# Patient Record
Sex: FEMALE | Race: WHITE | NOT HISPANIC OR LATINO | Employment: FULL TIME | URBAN - METROPOLITAN AREA
[De-identification: names, ages, dates, MRNs, and addresses within clinical notes are randomized per-mention and may not be internally consistent; named-entity substitution may affect disease eponyms.]

---

## 2018-04-12 ENCOUNTER — OFFICE VISIT CONVERTED (OUTPATIENT)
Dept: FAMILY MEDICINE CLINIC | Facility: CLINIC | Age: 33
End: 2018-04-12
Attending: NURSE PRACTITIONER

## 2018-08-30 ENCOUNTER — OFFICE VISIT CONVERTED (OUTPATIENT)
Dept: FAMILY MEDICINE CLINIC | Facility: CLINIC | Age: 33
End: 2018-08-30
Attending: NURSE PRACTITIONER

## 2018-09-12 ENCOUNTER — OFFICE VISIT CONVERTED (OUTPATIENT)
Dept: FAMILY MEDICINE CLINIC | Facility: CLINIC | Age: 33
End: 2018-09-12
Attending: NURSE PRACTITIONER

## 2018-09-12 ENCOUNTER — CONVERSION ENCOUNTER (OUTPATIENT)
Dept: FAMILY MEDICINE CLINIC | Facility: CLINIC | Age: 33
End: 2018-09-12

## 2021-04-28 ENCOUNTER — OFFICE VISIT CONVERTED (OUTPATIENT)
Dept: FAMILY MEDICINE CLINIC | Facility: CLINIC | Age: 36
End: 2021-04-28
Attending: NURSE PRACTITIONER

## 2021-04-28 ENCOUNTER — HOSPITAL ENCOUNTER (OUTPATIENT)
Dept: FAMILY MEDICINE CLINIC | Facility: CLINIC | Age: 36
Discharge: HOME | End: 2021-04-28
Attending: NURSE PRACTITIONER

## 2021-04-28 LAB
ALBUMIN SERPL-MCNC: 4.2 G/DL (ref 3.5–5)
ALBUMIN/GLOB SERPL: 1.3 {RATIO} (ref 1.4–2.6)
ALP SERPL-CCNC: 60 U/L (ref 42–98)
ALT SERPL-CCNC: 8 U/L (ref 10–40)
ANION GAP SERPL CALC-SCNC: 19 MMOL/L (ref 8–19)
AST SERPL-CCNC: 14 U/L (ref 15–50)
BASOPHILS # BLD AUTO: 0.05 10*3/UL (ref 0–0.2)
BASOPHILS NFR BLD AUTO: 0.9 % (ref 0–3)
BILIRUB SERPL-MCNC: <0.15 MG/DL (ref 0.2–1.3)
BILIRUB UR QL STRIP: NEGATIVE
BUN SERPL-MCNC: 5 MG/DL (ref 5–25)
BUN/CREAT SERPL: 8 {RATIO} (ref 6–20)
CALCIUM SERPL-MCNC: 9.6 MG/DL (ref 8.7–10.4)
CHLORIDE SERPL-SCNC: 107 MMOL/L (ref 99–111)
CHOLEST SERPL-MCNC: 196 MG/DL (ref 107–200)
CHOLEST/HDLC SERPL: 3.4 {RATIO} (ref 3–6)
COLOR UR: YELLOW
CONV ABS IMM GRAN: 0.01 10*3/UL (ref 0–0.2)
CONV CLARITY OF URINE: CLEAR
CONV CO2: 20 MMOL/L (ref 22–32)
CONV IMMATURE GRAN: 0.2 % (ref 0–1.8)
CONV PROTEIN IN URINE BY AUTOMATED TEST STRIP: NEGATIVE
CONV TOTAL PROTEIN: 7.4 G/DL (ref 6.3–8.2)
CONV UROBILINOGEN IN URINE BY AUTOMATED TEST STRIP: NORMAL
CREAT UR-MCNC: 0.66 MG/DL (ref 0.5–0.9)
DEPRECATED RDW RBC AUTO: 42.5 FL (ref 36.4–46.3)
EOSINOPHIL # BLD AUTO: 0.05 10*3/UL (ref 0–0.7)
EOSINOPHIL # BLD AUTO: 0.9 % (ref 0–7)
ERYTHROCYTE [DISTWIDTH] IN BLOOD BY AUTOMATED COUNT: 12.3 % (ref 11.7–14.4)
GFR SERPLBLD BASED ON 1.73 SQ M-ARVRAT: >60 ML/MIN/{1.73_M2}
GLOBULIN UR ELPH-MCNC: 3.2 G/DL (ref 2–3.5)
GLUCOSE SERPL-MCNC: 89 MG/DL (ref 65–99)
GLUCOSE UR QL: NEGATIVE
HCT VFR BLD AUTO: 34.2 % (ref 37–47)
HDLC SERPL-MCNC: 58 MG/DL (ref 40–60)
HGB BLD-MCNC: 10.7 G/DL (ref 12–16)
HGB UR QL STRIP: NEGATIVE
KETONES UR QL STRIP: NEGATIVE
LDLC SERPL CALC-MCNC: 107 MG/DL (ref 70–100)
LEUKOCYTE ESTERASE UR QL STRIP: NEGATIVE
LYMPHOCYTES # BLD AUTO: 1.91 10*3/UL (ref 1–5)
LYMPHOCYTES NFR BLD AUTO: 34.9 % (ref 20–45)
MCH RBC QN AUTO: 29.1 PG (ref 27–31)
MCHC RBC AUTO-ENTMCNC: 31.3 G/DL (ref 33–37)
MCV RBC AUTO: 92.9 FL (ref 81–99)
MONOCYTES # BLD AUTO: 0.38 10*3/UL (ref 0.2–1.2)
MONOCYTES NFR BLD AUTO: 6.9 % (ref 3–10)
NEUTROPHILS # BLD AUTO: 3.07 10*3/UL (ref 2–8)
NEUTROPHILS NFR BLD AUTO: 56.2 % (ref 30–85)
NITRITE UR QL STRIP: NEGATIVE
NRBC CBCN: 0 % (ref 0–0.7)
OSMOLALITY SERPL CALC.SUM OF ELEC: 291 MOSM/KG (ref 273–304)
PH UR STRIP.AUTO: 6 [PH]
PLATELET # BLD AUTO: 392 10*3/UL (ref 130–400)
PMV BLD AUTO: 10.3 FL (ref 9.4–12.3)
POTASSIUM SERPL-SCNC: 4.2 MMOL/L (ref 3.5–5.3)
RBC # BLD AUTO: 3.68 10*6/UL (ref 4.2–5.4)
SODIUM SERPL-SCNC: 142 MMOL/L (ref 135–147)
SP GR UR: 1.02
TRIGL SERPL-MCNC: 156 MG/DL (ref 40–150)
TSH SERPL-ACNC: 2.92 M[IU]/L (ref 0.27–4.2)
VLDLC SERPL-MCNC: 31 MG/DL (ref 5–37)
WBC # BLD AUTO: 5.47 10*3/UL (ref 4.8–10.8)

## 2021-04-29 ENCOUNTER — HOSPITAL ENCOUNTER (OUTPATIENT)
Dept: FAMILY MEDICINE CLINIC | Facility: CLINIC | Age: 36
Discharge: HOME | End: 2021-04-29
Attending: NURSE PRACTITIONER

## 2021-04-29 LAB
C TRACH RRNA CVX QL NAA+PROBE: NEGATIVE
FERRITIN SERPL-MCNC: 16 NG/ML (ref 10–200)
FOLATE SERPL-MCNC: >20 NG/ML (ref 4.8–20)
IRON SATN MFR SERPL: 21 % (ref 20–55)
IRON SERPL-MCNC: 123 UG/DL (ref 60–170)
N GONORRHOEA DNA SPEC QL NAA+PROBE: NEGATIVE
RBC # BLD AUTO: 3.81 10*6/UL (ref 4.2–5.4)
RETICS # AUTO: 0.04 10*6/UL (ref 0.02–0.08)
RETICS/RBC NFR AUTO: 1.16 % (ref 0.5–1.7)
TIBC SERPL-MCNC: 586 UG/DL (ref 245–450)
TRANSFERRIN SERPL-MCNC: 410 MG/DL (ref 250–380)
VIT B12 SERPL-MCNC: 381 PG/ML (ref 211–911)
WBC # BLD AUTO: 5.27 10*3/UL (ref 4.8–10.8)

## 2021-04-30 LAB — BACTERIA SPEC AEROBE CULT: NORMAL

## 2021-05-14 VITALS
OXYGEN SATURATION: 99 % | DIASTOLIC BLOOD PRESSURE: 70 MMHG | WEIGHT: 179 LBS | SYSTOLIC BLOOD PRESSURE: 115 MMHG | RESPIRATION RATE: 18 BRPM | HEART RATE: 68 BPM | TEMPERATURE: 97.8 F | HEIGHT: 65 IN | BODY MASS INDEX: 29.82 KG/M2

## 2021-05-14 NOTE — PROGRESS NOTES
Progress Note      Patient Name: Francine Calderon   Patient ID: [id number]   Sex: Female   YOB: 1985    Primary Care Provider: Bre MORGAN    Visit Date: April 28, 2021    Provider: CATHY Enriquez   Location: [facility]   Location Address: [address] [place] [address]  [id number]   Location Phone: [phone]          Chief Complaint  · Annual physical exam      History Of Present Illness  Francine Calderon is a 35 year old /White female who presents for evaluation and treatment of:      Annual physical exam      pt c/o burning with urination, urinary urgency and frequency    c/o- states that she has had urgency and frequency as well as burning with urination for 2 weeks.  Has had cloudy urine  also vaginal discharge. Has been yellowish-brown in color.     pap- 12/2020 EPW  miscarriage in 2.2021 at almost 9 weeks   LMP 4.14.2021  nonsmoker       Past Medical History  Disease Name Date Onset Notes   Scoliosis --  --          Past Surgical History  Procedure Name Date Notes   Fusion of lumbar or thoracic spine by posterior approach for treatment of scoliosis --  --    heart surgery --  --          Medication List  Name Date Started Instructions   Centrum MultiGummies 80 mcg oral tablet,chewable  chew 1 tablet by oral route daily   Nexium 40 mg oral capsule,delayed release(DR/EC)  take 1 capsule (40 mg) by oral route once QOD   Tri-Sprintec (28) 0.18/0.215/0.25 mg-35 mcg (28) oral tablet  take 1 tablet by oral route once daily   Zyrtec 10 mg oral tablet  take 1 tablet (10 mg) by oral route once daily         Allergy List  Allergen Name Date Reaction Notes   NO KNOWN DRUG ALLERGIES --  --  --          Family Medical History  Disease Name Relative/Age Notes   Cancer, Unspecified Aunt/  Grandmother (maternal)/   --    Melanoma Father/   --    Gallbladder Disorder Father/   --          Social History  Finding Status Start/Stop  "Quantity Notes   Tobacco Never --/-- --  --          Review of Systems  · Constitutional  o Denies  o : fever, chills  · Cardiovascular  o Denies  o : chest Pain  · Respiratory  o Denies  o : frequent cough, shortness of breath  · Gastrointestinal  o Denies  o : abdominal pain, nausea, vomiting, diarrhea  · Genitourinary  o Admits  o : urinary frequency, urinary urgency, vaginal discharge  o Denies  o : polyuria  · Integument  o Denies  o : rash  · Allergic-Immunologic  o Admits  o : seasonal allergies      Vitals  Date Time BP Position Site L\R Cuff Size HR RR TEMP (F) WT  HT  BMI kg/m2 BSA m2 O2 Sat FR L/min FiO2 HC       04/12/2018 08:45 /84 Sitting    72 - R 16  194lbs 16oz 5'  5\" 32.45 2.01 100 %      08/30/2018 08:47 /74 Sitting    61 - R 18  193lbs 16oz 5'  5\" 32.28 2.01 100 %      09/12/2018 07:07 /78 Sitting    79 - R   193lbs 16oz 5'  5\" 32.28 2.01 96 %      04/28/2021 07:39 /70 Sitting    68 - R 18 97.8 179lbs 0oz 5'  5\" 29.79 1.93 99 %            Physical Examination  · Constitutional  o Appearance  o : well-nourished, in no acute distress  · Eyes  o Conjunctivae  o : conjunctivae normal  o Sclerae  o : sclerae white  o Pupils and Irises  o : pupils equal and round  o Eyelids/Ocular Adnexae  o : eyelid appearance normal  · Ears, Nose, Mouth and Throat  o Ears  o :   § External Ears  § : external auditory canal appearance within normal limits  § Otoscopic Examination  § : tympanic membrane appearance within normal limits bilaterally  o Nose  o :   § External Nose  § : appearance normal  § Intranasal Exam  § : mucosa within normal limits  § Nasopharynx  § : no discharge present  o Throat  o :   § Oropharynx  § : no inflammation or lesions present, tonsils within normal limits  · Neck  o Inspection/Palpation  o : normal appearance, trachea midline  o Thyroid  o : gland size normal, nontender  · Respiratory  o Respiratory Effort  o : breathing unlabored  o Auscultation of Lungs  o : " normal breath sounds throughout inspiration and expiration  · Cardiovascular  o Heart  o :   § Auscultation of Heart  § : regular rate and rhythm, no murmurs  o Peripheral Vascular System  o :   § Carotid Arteries  § : no bruits present  § Extremities  § : no clubbing or edema  · Gastrointestinal  o Abdominal Examination  o : abdomen nontender to palpation, bowel sounds present   · Genitourinary  o External Genitalia  o : no inflammation, no lesions present  o Vagina  o : normal vaginal vault, white discharge present, no inflammatory lesions present, no masses present  o Urethra  o :   § Urethral Meatus  § : no inflammation present  § Urethral Body  § : urethra palpation normal  o Bladder  o : nontender to palpation  o Cervix  o : appearance healthy, no lesions present, nontender to palpation, no discharges, no bleeding present, normal midline position  o Uterus  o : nontender to palpation, no masses present, position midline/midplane  o Adnexa  o : no tenderness or masses present on bimanual examination  o Anus  o : no inflammation or lesions present  o Perineum  o : perineum within normal limits  · Lymphatic  o Neck  o : no lymphadenopathy present  · Skin and Subcutaneous Tissue  o General Inspection  o : no rashes or lesions present, no areas of discoloration  · Neurologic  o Mental Status Examination  o :   § Orientation  § : grossly oriented   o Gait and Station  o : normal gait, able to stand without difficulty  · Psychiatric  o Judgement and Insight  o : judgment and insight intact  o Mood and Affect  o : mood normal, affect appropriate          Results  · In-Office Procedures  o Lab procedure  § IOP - Urinalysis without Microscopy (Clinitek) [facility] ([id number])   § Color Ur: Yellow   § Clarity Ur: Clear   § Glucose Ur Ql Strip: Negative   § Bilirub Ur Ql Strip: Negative   § Ketones Ur Ql Strip: Negative   § Sp Gr Ur Qn: 1.020   § Hgb Ur Ql Strip: Negative   § pH Ur-LsCnc: 6.0   § Prot Ur Ql Strip: Negative    § Urobilinogen Ur Strip-mCnc: 0.2 E.U./dL   § Nitrite Ur Ql Strip: Negative   § WBC Est Ur Ql Strip: Negative       Assessment  · Annual physical exam     V70.0/Z00.00  · Allergic rhinitis due to allergen     477.9/J30.9  taking otc Zyrtec   · Urinary urgency     788.63/R39.15  · Urinary frequency     788.41/R35.0  · Vaginal discharge     623.5/N89.8  will obtain swabs, treat for vaginitis   · Burning with urination     788.1/R30.0      Plan  · Orders  o Physical, Primary Care Panel (CBC, CMP, Lipid, TSH) [facility] (77613, 11034, 02517, 21514) - 623.5/N89.8, 788.1/R30.0, V70.0/Z00.00 - 04/28/2021  o ACO-39: Current medications updated and reviewed (, 1159F) - - 04/28/2021  o Vaginal Screen (Candida, Gardnerella & Trichomonas) (47400) - 788.41/R35.0, 623.5/N89.8, 788.1/R30.0 - 04/28/2021  o GC/Chlamydia by PCR (Urine or Vaginal Swab) [facility] (CTNGX) - 788.41/R35.0, 623.5/N89.8, 788.1/R30.0 - 04/28/2021  o Vaginal culture (57379) - 788.41/R35.0, 623.5/N89.8, 788.1/R30.0 - 04/28/2021  · Medications  o Flagyl 500 mg oral tablet   SIG: take 1 tablet (500 mg) by oral route every 12 hours for 7 days   DISP: (14) Tablet with 0 refills  Prescribed on 04/28/2021     o Medications have been Reconciled  o Transition of Care or Provider Policy  · Instructions  o Patient was educated/instructed on their diagnosis, treatment and medications prior to discharge from the clinic today.  · Disposition  o will contact with diagnostics results  o FOLLOW UP PENDING RESULTS            Electronically Signed by: CATHY Enriquez -Author on April 28, 2021 08:19:29 AM

## 2021-05-16 VITALS
DIASTOLIC BLOOD PRESSURE: 78 MMHG | OXYGEN SATURATION: 96 % | HEIGHT: 65 IN | BODY MASS INDEX: 32.32 KG/M2 | HEART RATE: 79 BPM | WEIGHT: 194 LBS | SYSTOLIC BLOOD PRESSURE: 145 MMHG

## 2021-05-16 VITALS
OXYGEN SATURATION: 100 % | SYSTOLIC BLOOD PRESSURE: 127 MMHG | HEART RATE: 61 BPM | HEIGHT: 65 IN | RESPIRATION RATE: 18 BRPM | WEIGHT: 194 LBS | BODY MASS INDEX: 32.32 KG/M2 | DIASTOLIC BLOOD PRESSURE: 74 MMHG

## 2021-05-16 VITALS
HEIGHT: 65 IN | RESPIRATION RATE: 16 BRPM | SYSTOLIC BLOOD PRESSURE: 129 MMHG | HEART RATE: 72 BPM | BODY MASS INDEX: 32.49 KG/M2 | WEIGHT: 195 LBS | OXYGEN SATURATION: 100 % | DIASTOLIC BLOOD PRESSURE: 84 MMHG

## 2021-09-20 ENCOUNTER — E-VISIT (OUTPATIENT)
Dept: FAMILY MEDICINE CLINIC | Facility: TELEHEALTH | Age: 36
End: 2021-09-20

## 2021-09-21 ENCOUNTER — TELEPHONE (OUTPATIENT)
Dept: FAMILY MEDICINE CLINIC | Facility: CLINIC | Age: 36
End: 2021-09-21

## 2021-09-21 ENCOUNTER — OFFICE VISIT (OUTPATIENT)
Dept: FAMILY MEDICINE CLINIC | Facility: CLINIC | Age: 36
End: 2021-09-21

## 2021-09-21 VITALS
WEIGHT: 178 LBS | HEIGHT: 65 IN | BODY MASS INDEX: 29.66 KG/M2 | SYSTOLIC BLOOD PRESSURE: 124 MMHG | HEART RATE: 68 BPM | TEMPERATURE: 97.8 F | DIASTOLIC BLOOD PRESSURE: 75 MMHG | OXYGEN SATURATION: 98 %

## 2021-09-21 DIAGNOSIS — N76.0 ACUTE VAGINITIS: ICD-10-CM

## 2021-09-21 DIAGNOSIS — R35.0 URINE FREQUENCY: Primary | ICD-10-CM

## 2021-09-21 DIAGNOSIS — N89.8 VAGINAL ITCHING: ICD-10-CM

## 2021-09-21 PROBLEM — M41.9 SCOLIOSIS: Status: ACTIVE | Noted: 2021-09-21

## 2021-09-21 LAB
BILIRUB BLD-MCNC: NEGATIVE MG/DL
CANDIDA SPECIES: POSITIVE
CLARITY, POC: ABNORMAL
COLOR UR: YELLOW
GARDNERELLA VAGINALIS: NEGATIVE
GLUCOSE UR STRIP-MCNC: NEGATIVE MG/DL
KETONES UR QL: NEGATIVE
LEUKOCYTE EST, POC: ABNORMAL
NITRITE UR-MCNC: NEGATIVE MG/ML
PH UR: 5.5 [PH] (ref 5–8)
PROT UR STRIP-MCNC: ABNORMAL MG/DL
RBC # UR STRIP: NEGATIVE /UL
SP GR UR: 1.03 (ref 1–1.03)
T VAGINALIS DNA VAG QL PROBE+SIG AMP: NEGATIVE
UROBILINOGEN UR QL: NORMAL

## 2021-09-21 PROCEDURE — 87480 CANDIDA DNA DIR PROBE: CPT | Performed by: NURSE PRACTITIONER

## 2021-09-21 PROCEDURE — 99213 OFFICE O/P EST LOW 20 MIN: CPT | Performed by: NURSE PRACTITIONER

## 2021-09-21 PROCEDURE — 87070 CULTURE OTHR SPECIMN AEROBIC: CPT | Performed by: NURSE PRACTITIONER

## 2021-09-21 PROCEDURE — 87660 TRICHOMONAS VAGIN DIR PROBE: CPT | Performed by: NURSE PRACTITIONER

## 2021-09-21 PROCEDURE — 87205 SMEAR GRAM STAIN: CPT | Performed by: NURSE PRACTITIONER

## 2021-09-21 PROCEDURE — 87510 GARDNER VAG DNA DIR PROBE: CPT | Performed by: NURSE PRACTITIONER

## 2021-09-21 PROCEDURE — 81003 URINALYSIS AUTO W/O SCOPE: CPT | Performed by: NURSE PRACTITIONER

## 2021-09-21 PROCEDURE — 87086 URINE CULTURE/COLONY COUNT: CPT | Performed by: NURSE PRACTITIONER

## 2021-09-21 RX ORDER — ESOMEPRAZOLE MAGNESIUM 40 MG/1
CAPSULE, DELAYED RELEASE ORAL
COMMUNITY
End: 2021-12-31 | Stop reason: SDUPTHER

## 2021-09-21 RX ORDER — METRONIDAZOLE 500 MG/1
500 TABLET ORAL 2 TIMES DAILY
Qty: 14 TABLET | Refills: 0 | Status: SHIPPED | OUTPATIENT
Start: 2021-09-21 | End: 2021-09-28

## 2021-09-21 RX ORDER — NORGESTIMATE AND ETHINYL ESTRADIOL 7DAYSX3 28
1 KIT ORAL DAILY
COMMUNITY
Start: 2021-08-29 | End: 2022-01-18 | Stop reason: SDUPTHER

## 2021-09-21 RX ORDER — CETIRIZINE HYDROCHLORIDE 10 MG/1
TABLET ORAL
COMMUNITY
End: 2022-12-07 | Stop reason: SDUPTHER

## 2021-09-21 RX ORDER — FLUCONAZOLE 150 MG/1
150 TABLET ORAL
Qty: 3 TABLET | Refills: 0 | Status: SHIPPED | OUTPATIENT
Start: 2021-09-21 | End: 2021-09-28

## 2021-09-21 NOTE — TELEPHONE ENCOUNTER
----- Message from CATHY Gaviria sent at 9/21/2021 12:38 PM EDT -----  Positive for yeast take Diflucan as directed

## 2021-09-21 NOTE — PROGRESS NOTES
Follow Up Office Visit      Patient Name: Francine Calderon  : 1985   MRN: [id number]     Chief Complaint:    Chief Complaint   Patient presents with   • Vaginal Itching   • Urinary Frequency       History of Present Illness: Francine Calderon is a 35 y.o. female who is here today for   C/O-vaginal itching/ irration, urinary frequency, frequent BV in the past  X1 WEEK     Pap-2020 EPW  LMP 2021      Subjective      Review of Systems:   Review of Systems   Constitutional: Negative for fever.   Respiratory: Negative for cough.    Cardiovascular: Negative for chest pain.   Gastrointestinal: Negative for abdominal pain, diarrhea, nausea and vomiting.   Genitourinary: Positive for dysuria and vaginal discharge. Negative for pelvic pain.   Skin: Negative for rash.        Past Medical History: History reviewed. No pertinent past medical history.    Past Surgical History: No past surgical history on file.    Family History: No family history on file.    Social History:   Social History     Socioeconomic History   • Marital status: Single     Spouse name: Not on file   • Number of children: Not on file   • Years of education: Not on file   • Highest education level: Not on file   Tobacco Use   • Smoking status: Never Smoker   • Smokeless tobacco: Never Used       Medications:     Current Outpatient Medications:   •  cetirizine (ZyrTEC Allergy) 10 MG tablet, Zyrtec 10 mg oral tablet take 1 tablet (10 mg) by oral route once daily   Active, Disp: , Rfl:   •  esomeprazole (nexIUM) 40 MG capsule, Nexium 40 mg oral capsule,delayed release(DR/EC) take 1 capsule (40 mg) by oral route once QOD   Active, Disp: , Rfl:   •  fluconazole (Diflucan) 150 MG tablet, Take 1 tablet by mouth Every 72 (Seventy-Two) Hours for 3 doses., Disp: 3 tablet, Rfl: 0  •  metroNIDAZOLE (Flagyl) 500 MG tablet, Take 1 tablet by mouth 2 (Two) Times a Day for 7 days., Disp: 14 tablet, Rfl: 0  •  Tri-Sprintec 0.18/0.215/0.25 MG-35 MCG per tablet, Take  "1 tablet by mouth Daily., Disp: , Rfl:     Allergies:   No Known Allergies      Objective     Physical Exam:  Vital Signs:   Vitals:    09/21/21 0750   BP: 124/75   Pulse: 68   Temp: 97.8 °F (36.6 °C)   SpO2: 98%   Weight: 80.7 kg (178 lb)   Height: 165.1 cm (65\")     Body mass index is 29.62 kg/m².     Physical Exam  Cardiovascular:      Rate and Rhythm: Normal rate and regular rhythm.      Heart sounds: Normal heart sounds. No murmur heard.     Pulmonary:      Effort: Pulmonary effort is normal.      Breath sounds: Normal breath sounds.   Abdominal:      General: Bowel sounds are normal.      Palpations: Abdomen is soft.   Genitourinary:     General: Normal vulva.      Vagina: Vaginal discharge present.      Cervix: Normal.      Uterus: Normal.       Adnexa: Right adnexa normal and left adnexa normal.      Rectum: Normal.   Skin:     General: Skin is warm and dry.   Neurological:      Mental Status: She is alert.             Assessment / Plan      Assessment/Plan:   Diagnoses and all orders for this visit:    1. Urine frequency (Primary)  -     POCT urinalysis dipstick, automated  -     Gardnerella vaginalis, Trichomonas vaginalis, Candida albicans, DNA - Swab, Vagina  -     Genital Culture - Swab, Vagina; Future  -     Urine Culture - Urine, Urine, Clean Catch; Future  -     Genital Culture - Swab, Vagina  -     Urine Culture - Urine, Urine, Clean Catch    2. Vaginal itching  -     Gardnerella vaginalis, Trichomonas vaginalis, Candida albicans, DNA - Swab, Vagina  -     Genital Culture - Swab, Vagina; Future  -     Genital Culture - Swab, Vagina    3. Acute vaginitis    Other orders  -     metroNIDAZOLE (Flagyl) 500 MG tablet; Take 1 tablet by mouth 2 (Two) Times a Day for 7 days.  Dispense: 14 tablet; Refill: 0  -     fluconazole (Diflucan) 150 MG tablet; Take 1 tablet by mouth Every 72 (Seventy-Two) Hours for 3 doses.  Dispense: 3 tablet; Refill: 0       Will obtain urine culture for abnormal UA in office, will " treat vaginitis and call with swab results       Follow Up:   Return if symptoms worsen or fail to improve.    Gabriela Paulino, APRN      Please note that portions of this note may have been completed with a voice recognition program. Efforts were made to edit the dictations, but occasionally words are mistranscribed.

## 2021-09-22 LAB — BACTERIA SPEC AEROBE CULT: ABNORMAL

## 2021-09-24 LAB
BACTERIA SPEC AEROBE CULT: ABNORMAL
GRAM STN SPEC: ABNORMAL

## 2022-01-18 ENCOUNTER — OFFICE VISIT (OUTPATIENT)
Dept: FAMILY MEDICINE CLINIC | Facility: CLINIC | Age: 37
End: 2022-01-18

## 2022-01-18 VITALS
DIASTOLIC BLOOD PRESSURE: 77 MMHG | HEIGHT: 65 IN | SYSTOLIC BLOOD PRESSURE: 117 MMHG | OXYGEN SATURATION: 100 % | TEMPERATURE: 98.6 F | HEART RATE: 68 BPM | WEIGHT: 178 LBS | BODY MASS INDEX: 29.66 KG/M2

## 2022-01-18 DIAGNOSIS — Z30.41 ENCOUNTER FOR SURVEILLANCE OF CONTRACEPTIVE PILLS: ICD-10-CM

## 2022-01-18 DIAGNOSIS — Z01.419 WELL FEMALE EXAM WITH ROUTINE GYNECOLOGICAL EXAM: Primary | ICD-10-CM

## 2022-01-18 DIAGNOSIS — Z12.4 SCREENING FOR CERVICAL CANCER: ICD-10-CM

## 2022-01-18 DIAGNOSIS — N89.8 VAGINAL DISCHARGE: ICD-10-CM

## 2022-01-18 LAB
CANDIDA SPECIES: NEGATIVE
GARDNERELLA VAGINALIS: NEGATIVE
T VAGINALIS DNA VAG QL PROBE+SIG AMP: NEGATIVE

## 2022-01-18 PROCEDURE — 87480 CANDIDA DNA DIR PROBE: CPT | Performed by: NURSE PRACTITIONER

## 2022-01-18 PROCEDURE — G0123 SCREEN CERV/VAG THIN LAYER: HCPCS | Performed by: NURSE PRACTITIONER

## 2022-01-18 PROCEDURE — 87660 TRICHOMONAS VAGIN DIR PROBE: CPT | Performed by: NURSE PRACTITIONER

## 2022-01-18 PROCEDURE — 87510 GARDNER VAG DNA DIR PROBE: CPT | Performed by: NURSE PRACTITIONER

## 2022-01-18 PROCEDURE — 99395 PREV VISIT EST AGE 18-39: CPT | Performed by: NURSE PRACTITIONER

## 2022-01-18 RX ORDER — NORGESTIMATE AND ETHINYL ESTRADIOL 7DAYSX3 28
1 KIT ORAL DAILY
Qty: 90 TABLET | Refills: 3 | Status: SHIPPED | OUTPATIENT
Start: 2022-01-18 | End: 2022-11-04

## 2022-01-18 NOTE — PROGRESS NOTES
"     Female Physical / PAP Note      Patient Name: Francine Calderon  : 1985   MRN: [id number]     Chief Complaint:    Chief Complaint   Patient presents with   • Gynecologic Exam       History of Present Illness: Francine Calderon is a 36 y.o. female who is here today for her annual health maintenance and physical.   Refill birth controlled       Pap-  Mammogram-never  LMP-21      Subjective      Review of Systems:   Review of Systems   Constitutional: Negative for fever.   Respiratory: Negative for cough.    Cardiovascular: Negative for chest pain.   Gastrointestinal: Negative for abdominal pain, diarrhea, nausea and vomiting.   Genitourinary: Negative for dysuria, pelvic pain and vaginal bleeding.   Skin: Negative for rash.      Breast - No tenderness, lumps, discharge, or blood from nipples.      Past Medical History, Social History, Family History and Care Team were all reviewed with patient and updated as appropriate.     Medications:     Current Outpatient Medications:   •  cetirizine (ZyrTEC Allergy) 10 MG tablet, Zyrtec 10 mg oral tablet take 1 tablet (10 mg) by oral route once daily   Active, Disp: , Rfl:   •  Tri-Sprintec 0.18/0.215/0.25 MG-35 MCG per tablet, Take 1 tablet by mouth Daily., Disp: 90 tablet, Rfl: 3    Allergies:   No Known Allergies    Objective     Physical Exam:  Vital Signs:   Vitals:    22 0720   BP: 117/77   Pulse: 68   Temp: 98.6 °F (37 °C)   SpO2: 100%   Weight: 80.7 kg (178 lb)   Height: 165.1 cm (65\")     Body mass index is 29.62 kg/m².     Physical Exam  Neck:      Vascular: No carotid bruit.   Cardiovascular:      Rate and Rhythm: Normal rate and regular rhythm.      Heart sounds: Normal heart sounds. No murmur heard.      Pulmonary:      Effort: Pulmonary effort is normal.      Breath sounds: Normal breath sounds.   Abdominal:      General: Bowel sounds are normal.      Palpations: Abdomen is soft.   Genitourinary:     General: Normal vulva.      Vagina: Vaginal " "discharge present.      Cervix: Normal.      Uterus: Normal.       Adnexa: Right adnexa normal and left adnexa normal.      Rectum: Normal.   Skin:     General: Skin is warm and dry.   Neurological:      Mental Status: She is alert.             Assessment / Plan      Assessment/Plan:   Diagnoses and all orders for this visit:    1. Well female exam with routine gynecological exam (Primary)  -     Gardnerella vaginalis, Trichomonas vaginalis, Candida albicans, DNA - Swab, Vagina    2. Screening for cervical cancer  -     PAP, Liquid Based ([facility] Only)    3. Encounter for surveillance of contraceptive pills    4. Vaginal discharge  -     Gardnerella vaginalis, Trichomonas vaginalis, Candida albicans, DNA - Swab, Vagina    Other orders  -     Tri-Sprintec 0.18/0.215/0.25 MG-35 MCG per tablet; Take 1 tablet by mouth Daily.  Dispense: 90 tablet; Refill: 3             Follow Up:   Return in about 1 year (around 1/18/2023).    Healthcare Maintenance:   Counseling provided on screening mammogram, screening colonoscopy, diet and exercise   Francine Calderon voices understanding and acceptance of this advice and will call back with any further questions or concerns. AVS with preventive healthcare tips printed for patient.     Bre Paulino, CATHY    \"Please note that portions of this note were completed with a voice recognition program.\"    "

## 2022-01-23 LAB
CYTOLOGIST CVX/VAG CYTO: NORMAL
CYTOLOGY CVX/VAG DOC CYTO: NORMAL
DX ICD CODE: NORMAL
HIV 1 & 2 AB SER-IMP: NORMAL
OTHER STN SPEC: NORMAL
STAT OF ADQ CVX/VAG CYTO-IMP: NORMAL

## 2022-01-26 ENCOUNTER — TELEPHONE (OUTPATIENT)
Dept: FAMILY MEDICINE CLINIC | Facility: CLINIC | Age: 37
End: 2022-01-26

## 2022-01-26 NOTE — TELEPHONE ENCOUNTER
----- Message from CATHY Gaviria sent at 1/24/2022  8:20 AM EST -----  NEGATIVE FOR INTRAEPITHELIAL LESION OR MALIGNANCY

## 2022-11-04 ENCOUNTER — OFFICE VISIT (OUTPATIENT)
Dept: FAMILY MEDICINE CLINIC | Facility: CLINIC | Age: 37
End: 2022-11-04

## 2022-11-04 VITALS
DIASTOLIC BLOOD PRESSURE: 89 MMHG | SYSTOLIC BLOOD PRESSURE: 129 MMHG | HEART RATE: 71 BPM | BODY MASS INDEX: 30.32 KG/M2 | TEMPERATURE: 98.2 F | WEIGHT: 182 LBS | HEIGHT: 65 IN | OXYGEN SATURATION: 99 %

## 2022-11-04 DIAGNOSIS — F33.0 MILD EPISODE OF RECURRENT MAJOR DEPRESSIVE DISORDER: ICD-10-CM

## 2022-11-04 DIAGNOSIS — J30.2 SEASONAL ALLERGIES: ICD-10-CM

## 2022-11-04 DIAGNOSIS — D64.9 ANEMIA, UNSPECIFIED TYPE: ICD-10-CM

## 2022-11-04 DIAGNOSIS — Z11.59 NEED FOR HEPATITIS C SCREENING TEST: ICD-10-CM

## 2022-11-04 DIAGNOSIS — E78.2 MIXED HYPERLIPIDEMIA: ICD-10-CM

## 2022-11-04 DIAGNOSIS — N92.0 MENORRHAGIA WITH REGULAR CYCLE: ICD-10-CM

## 2022-11-04 DIAGNOSIS — R53.83 FATIGUE, UNSPECIFIED TYPE: ICD-10-CM

## 2022-11-04 PROBLEM — F33.9 RECURRENT MAJOR DEPRESSIVE DISORDER (HCC): Status: ACTIVE | Noted: 2022-11-04

## 2022-11-04 LAB
ALBUMIN SERPL-MCNC: 4.6 G/DL (ref 3.5–5.2)
ALBUMIN/GLOB SERPL: 1.8 G/DL
ALP SERPL-CCNC: 59 U/L (ref 39–117)
ALT SERPL W P-5'-P-CCNC: 19 U/L (ref 1–33)
ANION GAP SERPL CALCULATED.3IONS-SCNC: 11 MMOL/L (ref 5–15)
AST SERPL-CCNC: 20 U/L (ref 1–32)
BASOPHILS # BLD AUTO: 0.04 10*3/MM3 (ref 0–0.2)
BASOPHILS NFR BLD AUTO: 0.7 % (ref 0–1.5)
BILIRUB SERPL-MCNC: 0.3 MG/DL (ref 0–1.2)
BUN SERPL-MCNC: 8 MG/DL (ref 6–20)
BUN/CREAT SERPL: 17.8 (ref 7–25)
CALCIUM SPEC-SCNC: 10.4 MG/DL (ref 8.6–10.5)
CHLORIDE SERPL-SCNC: 102 MMOL/L (ref 98–107)
CHOLEST SERPL-MCNC: 213 MG/DL (ref 0–200)
CO2 SERPL-SCNC: 26 MMOL/L (ref 22–29)
CREAT SERPL-MCNC: 0.45 MG/DL (ref 0.57–1)
DEPRECATED RDW RBC AUTO: 41.8 FL (ref 37–54)
EGFRCR SERPLBLD CKD-EPI 2021: 127.3 ML/MIN/1.73
EOSINOPHIL # BLD AUTO: 0.04 10*3/MM3 (ref 0–0.4)
EOSINOPHIL NFR BLD AUTO: 0.7 % (ref 0.3–6.2)
ERYTHROCYTE [DISTWIDTH] IN BLOOD BY AUTOMATED COUNT: 12.6 % (ref 12.3–15.4)
FERRITIN SERPL-MCNC: 113 NG/ML (ref 13–150)
GLOBULIN UR ELPH-MCNC: 2.6 GM/DL
GLUCOSE SERPL-MCNC: 78 MG/DL (ref 65–99)
HCT VFR BLD AUTO: 38.3 % (ref 34–46.6)
HCV AB SER DONR QL: NORMAL
HDLC SERPL-MCNC: 55 MG/DL (ref 40–60)
HGB BLD-MCNC: 13.1 G/DL (ref 12–15.9)
IMM GRANULOCYTES # BLD AUTO: 0.03 10*3/MM3 (ref 0–0.05)
IMM GRANULOCYTES NFR BLD AUTO: 0.6 % (ref 0–0.5)
IRON 24H UR-MRATE: 203 MCG/DL (ref 37–145)
IRON SATN MFR SERPL: 36 % (ref 20–50)
LDLC SERPL CALC-MCNC: 132 MG/DL (ref 0–100)
LDLC/HDLC SERPL: 2.33 {RATIO}
LYMPHOCYTES # BLD AUTO: 2.03 10*3/MM3 (ref 0.7–3.1)
LYMPHOCYTES NFR BLD AUTO: 37.3 % (ref 19.6–45.3)
MCH RBC QN AUTO: 31.1 PG (ref 26.6–33)
MCHC RBC AUTO-ENTMCNC: 34.2 G/DL (ref 31.5–35.7)
MCV RBC AUTO: 91 FL (ref 79–97)
MONOCYTES # BLD AUTO: 0.4 10*3/MM3 (ref 0.1–0.9)
MONOCYTES NFR BLD AUTO: 7.4 % (ref 5–12)
NEUTROPHILS NFR BLD AUTO: 2.9 10*3/MM3 (ref 1.7–7)
NEUTROPHILS NFR BLD AUTO: 53.3 % (ref 42.7–76)
NRBC BLD AUTO-RTO: 0 /100 WBC (ref 0–0.2)
PLATELET # BLD AUTO: 389 10*3/MM3 (ref 140–450)
PMV BLD AUTO: 9.9 FL (ref 6–12)
POTASSIUM SERPL-SCNC: 4.4 MMOL/L (ref 3.5–5.2)
PROT SERPL-MCNC: 7.2 G/DL (ref 6–8.5)
RBC # BLD AUTO: 4.21 10*6/MM3 (ref 3.77–5.28)
SODIUM SERPL-SCNC: 139 MMOL/L (ref 136–145)
T4 FREE SERPL-MCNC: 0.91 NG/DL (ref 0.93–1.7)
TIBC SERPL-MCNC: 569 MCG/DL (ref 298–536)
TRANSFERRIN SERPL-MCNC: 382 MG/DL (ref 200–360)
TRIGL SERPL-MCNC: 149 MG/DL (ref 0–150)
TSH SERPL DL<=0.05 MIU/L-ACNC: 2.67 UIU/ML (ref 0.27–4.2)
VIT B12 BLD-MCNC: 426 PG/ML (ref 211–946)
VLDLC SERPL-MCNC: 26 MG/DL (ref 5–40)
WBC NRBC COR # BLD: 5.44 10*3/MM3 (ref 3.4–10.8)

## 2022-11-04 PROCEDURE — 83540 ASSAY OF IRON: CPT | Performed by: NURSE PRACTITIONER

## 2022-11-04 PROCEDURE — 99214 OFFICE O/P EST MOD 30 MIN: CPT | Performed by: NURSE PRACTITIONER

## 2022-11-04 PROCEDURE — 84466 ASSAY OF TRANSFERRIN: CPT | Performed by: NURSE PRACTITIONER

## 2022-11-04 PROCEDURE — 80061 LIPID PANEL: CPT | Performed by: NURSE PRACTITIONER

## 2022-11-04 PROCEDURE — 80050 GENERAL HEALTH PANEL: CPT | Performed by: NURSE PRACTITIONER

## 2022-11-04 PROCEDURE — 86803 HEPATITIS C AB TEST: CPT | Performed by: NURSE PRACTITIONER

## 2022-11-04 PROCEDURE — 82728 ASSAY OF FERRITIN: CPT | Performed by: NURSE PRACTITIONER

## 2022-11-04 PROCEDURE — 84439 ASSAY OF FREE THYROXINE: CPT | Performed by: NURSE PRACTITIONER

## 2022-11-04 PROCEDURE — 82607 VITAMIN B-12: CPT | Performed by: NURSE PRACTITIONER

## 2022-11-04 RX ORDER — CEFDINIR 300 MG/1
CAPSULE ORAL
COMMUNITY
Start: 2022-08-23 | End: 2022-11-04

## 2022-11-04 RX ORDER — DROSPIRENONE AND ETHINYL ESTRADIOL 0.02-3(28)
1 KIT ORAL DAILY
Qty: 90 TABLET | Refills: 3 | Status: SHIPPED | OUTPATIENT
Start: 2022-11-04 | End: 2023-03-23

## 2022-11-04 RX ORDER — METHYLPREDNISOLONE 4 MG/1
TABLET ORAL
COMMUNITY
Start: 2022-08-23 | End: 2022-11-04

## 2022-11-04 RX ORDER — DESVENLAFAXINE 25 MG/1
25 TABLET, EXTENDED RELEASE ORAL DAILY
Qty: 30 TABLET | Refills: 5 | Status: SHIPPED | OUTPATIENT
Start: 2022-11-04 | End: 2022-12-07 | Stop reason: SDUPTHER

## 2022-11-04 RX ORDER — LACTOBACILLUS ACIDOPHILUS 500MM CELL
CAPSULE ORAL
COMMUNITY
Start: 2022-03-01

## 2022-11-04 NOTE — PROGRESS NOTES
FOLLOW UP     Patient Name: Francine Calderon  : 1985   MRN: [id number]     Chief Complaint:    Chief Complaint   Patient presents with   • Menorrhagia   • Anemia   • Hyperlipidemia   • Depression   • Allergic Rhinitis       History of Present Illness: Francine Calderon is a 37 y.o. female who is here today for follow up hyperlipidemia, anemia, depression, allergic rhinitis  Also heavy menses and to discuss birth control.    Labs-2021  Pap- 2022  lmp- 10/26/22    C/o Patient says she has always had heavy periods and death with it, but she's tired of it. She says they occur every month and last the same amount of days every month.  Patient is currently on Tri Sprintec    Also feels her depression is uncontrolled with exercise, walking daily 30-60 minutes  Pt reports she is crying, secluding herself, does have a do as a    Counseling scheduled to start     Pt reports trouble sleeping, states mind wont shut off        Subjective      Review of Systems:   Review of Systems   Constitutional: Positive for fatigue.   HENT: Negative for ear pain and sore throat.    Respiratory: Negative for cough.    Cardiovascular: Negative for chest pain.   Gastrointestinal: Negative for abdominal pain, diarrhea, nausea and vomiting.   Genitourinary: Positive for menstrual problem.   Psychiatric/Behavioral: Positive for sleep disturbance. The patient is nervous/anxious.         Past Medical History: No past medical history on file.    Past Surgical History:   Past Surgical History:   Procedure Laterality Date   • BACK SURGERY      to repair scoliosis       Family History: No family history on file.    Social History:   Social History     Socioeconomic History   • Marital status: Single   Tobacco Use   • Smoking status: Never   • Smokeless tobacco: Never   Vaping Use   • Vaping Use: Never used   Substance and Sexual Activity   • Alcohol use: Yes     Comment: rare   • Drug use: Never       Medications:     Current  "Outpatient Medications:   •  Acidophilus Lactobacillus capsule, , Disp: , Rfl:   •  cetirizine (ZyrTEC Allergy) 10 MG tablet, Zyrtec 10 mg oral tablet take 1 tablet (10 mg) by oral route once daily   Active, Disp: , Rfl:   •  Desvenlafaxine Succinate ER 25 MG tablet sustained-release 24 hour, Take 1 tablet by mouth Daily., Disp: 30 tablet, Rfl: 5  •  drospirenone-ethinyl estradiol (YAIMA) 3-0.02 MG per tablet, Take 1 tablet by mouth Daily., Disp: 90 tablet, Rfl: 3    Allergies:   No Known Allergies      Objective     Physical Exam:  Vital Signs:   Vitals:    11/04/22 0732   BP: 129/89   Pulse: 71   Temp: 98.2 °F (36.8 °C)   SpO2: 99%   Weight: 82.6 kg (182 lb)   Height: 165.1 cm (65\")     Body mass index is 30.29 kg/m².     Physical Exam  HENT:      Right Ear: Tympanic membrane normal.      Left Ear: Tympanic membrane normal.      Nose: Nose normal.      Mouth/Throat:      Mouth: Mucous membranes are moist.   Eyes:      Conjunctiva/sclera: Conjunctivae normal.   Neck:      Vascular: No carotid bruit.   Cardiovascular:      Rate and Rhythm: Normal rate and regular rhythm.      Heart sounds: Normal heart sounds. No murmur heard.  Pulmonary:      Effort: Pulmonary effort is normal.      Breath sounds: Normal breath sounds.   Abdominal:      General: Bowel sounds are normal.      Palpations: Abdomen is soft.   Musculoskeletal:      Right lower leg: No edema.      Left lower leg: No edema.   Skin:     General: Skin is warm and dry.   Neurological:      Mental Status: She is alert.   Psychiatric:         Mood and Affect: Mood normal.         Behavior: Behavior normal.           Assessment / Plan      Assessment/Plan:   Diagnoses and all orders for this visit:    1. Mixed hyperlipidemia  -     Comprehensive Metabolic Panel  -     Lipid Panel    2. Anemia, unspecified type  -     CBC Auto Differential  -     Iron Profile  -     Ferritin  -     T4, Free  -     Vitamin B12    3. Menorrhagia with regular cycle    4. Mild episode " of recurrent major depressive disorder (HCC)    5. Seasonal allergies    6. Fatigue, unspecified type  -     TSH    7. Need for hepatitis C screening test  -     Hepatitis C Antibody    Other orders  -     Desvenlafaxine Succinate ER 25 MG tablet sustained-release 24 hour; Take 1 tablet by mouth Daily.  Dispense: 30 tablet; Refill: 5  -     drospirenone-ethinyl estradiol (YAIMA) 3-0.02 MG per tablet; Take 1 tablet by mouth Daily.  Dispense: 90 tablet; Refill: 3       Hyperlipidemia obtain lipid  recommend decrease fried foods red meat pork products cheese exercise 30 minutes daily increase fruits vegetables whole grains  Anemia will obtain labs to monitor for further recommendation for supplementation  Menorrhagia with regular cycle will change birth control pills to YAIMA  Depression we will start Pristiq recommend to keep appointment with counselor  Seasonal allergies controlled with Zyrtec  Fatigue will obtain labs if all negative would recommend sleep study may be related to depression        Follow Up:   Return in about 4 weeks (around 12/2/2022).    CTAHY Mohr      Please note that portions of this note were completed with a voice recognition program.

## 2022-11-08 DIAGNOSIS — E86.0 DEHYDRATION, MODERATE: Primary | ICD-10-CM

## 2022-11-23 PROCEDURE — 87081 CULTURE SCREEN ONLY: CPT | Performed by: NURSE PRACTITIONER

## 2022-11-23 PROCEDURE — U0004 COV-19 TEST NON-CDC HGH THRU: HCPCS | Performed by: NURSE PRACTITIONER

## 2022-11-25 ENCOUNTER — TELEPHONE (OUTPATIENT)
Dept: URGENT CARE | Facility: CLINIC | Age: 37
End: 2022-11-25

## 2022-11-25 NOTE — TELEPHONE ENCOUNTER
----- Message from CATHY Rosas sent at 11/25/2022  9:32 AM EST -----  Please continue to try to reach the patient regarding her positive COVID test result.  I have already called her on 11/25/2022 and left a message for her to return my call.  The patient should continue to quarantine and continue supportive care.  She should follow-up as needed or if her symptoms worsen or persist.

## 2022-12-07 ENCOUNTER — OFFICE VISIT (OUTPATIENT)
Dept: FAMILY MEDICINE CLINIC | Facility: CLINIC | Age: 37
End: 2022-12-07

## 2022-12-07 VITALS
DIASTOLIC BLOOD PRESSURE: 85 MMHG | OXYGEN SATURATION: 99 % | HEIGHT: 65 IN | SYSTOLIC BLOOD PRESSURE: 125 MMHG | WEIGHT: 178 LBS | TEMPERATURE: 97.7 F | BODY MASS INDEX: 29.66 KG/M2 | HEART RATE: 67 BPM

## 2022-12-07 DIAGNOSIS — R79.0 ABNORMAL SERUM IRON LEVEL: ICD-10-CM

## 2022-12-07 DIAGNOSIS — E78.2 MIXED HYPERLIPIDEMIA: ICD-10-CM

## 2022-12-07 DIAGNOSIS — N92.0 MENORRHAGIA WITH REGULAR CYCLE: Primary | ICD-10-CM

## 2022-12-07 DIAGNOSIS — H92.01 RIGHT EAR PAIN: ICD-10-CM

## 2022-12-07 DIAGNOSIS — U07.1 COVID: ICD-10-CM

## 2022-12-07 DIAGNOSIS — F33.0 MILD EPISODE OF RECURRENT MAJOR DEPRESSIVE DISORDER: ICD-10-CM

## 2022-12-07 DIAGNOSIS — R79.89 LOW SERUM T4 LEVEL: ICD-10-CM

## 2022-12-07 DIAGNOSIS — J30.2 SEASONAL ALLERGIES: ICD-10-CM

## 2022-12-07 DIAGNOSIS — R09.81 SINUS CONGESTION: ICD-10-CM

## 2022-12-07 PROCEDURE — 99214 OFFICE O/P EST MOD 30 MIN: CPT | Performed by: NURSE PRACTITIONER

## 2022-12-07 RX ORDER — DESVENLAFAXINE 25 MG/1
25 TABLET, EXTENDED RELEASE ORAL DAILY
Qty: 90 TABLET | Refills: 1 | Status: SHIPPED | OUTPATIENT
Start: 2022-12-07 | End: 2023-03-23 | Stop reason: SDUPTHER

## 2022-12-07 RX ORDER — CETIRIZINE HYDROCHLORIDE 10 MG/1
10 TABLET ORAL NIGHTLY
Qty: 90 TABLET | Refills: 1 | Status: SHIPPED | OUTPATIENT
Start: 2022-12-07 | End: 2023-03-23 | Stop reason: SDUPTHER

## 2022-12-07 RX ORDER — FLUTICASONE PROPIONATE 50 MCG
2 SPRAY, SUSPENSION (ML) NASAL DAILY
Qty: 18.2 ML | Refills: 1 | Status: SHIPPED | OUTPATIENT
Start: 2022-12-07 | End: 2023-03-23 | Stop reason: SDUPTHER

## 2022-12-07 NOTE — PROGRESS NOTES
Follow Up Office Visit      Patient Name: Francine Calderon  : 1985   MRN: [id number]     Chief Complaint:    Chief Complaint   Patient presents with   • Anemia   • Hyperlipidemia   • Depression   • Allergic Rhinitis       History of Present Illness: Francine Calderon is a 37 y.o. female who is here today to follow up for anemia, hyperlipidemia, depression, allergic rhinitis and menorrhagia with regular cycle     Patient is doing really well after started Pristiq after her last visit. States she loves this medication, calmer, less anxiety, not feeling overwhelmed, feeling more herself      Changing to derrick decrease in flow and severity of cramps     Labs- 2022  Pap-2022  lmp- 2022    C/o Patient c/o right ear pain since diagnosed with covid 2022, using zyrtec at this time       Subjective      Review of Systems:   Review of Systems   Constitutional: Negative for fever.   HENT: Positive for ear pain, postnasal drip and sore throat.    Eyes: Negative for discharge.   Respiratory: Negative for cough.    Cardiovascular: Negative for chest pain.   Gastrointestinal: Negative for abdominal pain, diarrhea, nausea and vomiting.   Genitourinary: Negative for dysuria.   Musculoskeletal: Negative for myalgias.   Neurological: Negative for headaches.   Psychiatric/Behavioral: The patient is not nervous/anxious.         Past Medical History: No past medical history on file.    Past Surgical History:   Past Surgical History:   Procedure Laterality Date   • BACK SURGERY      to repair scoliosis       Family History: No family history on file.    Social History:   Social History     Socioeconomic History   • Marital status: Single   Tobacco Use   • Smoking status: Never   • Smokeless tobacco: Never   Vaping Use   • Vaping Use: Never used   Substance and Sexual Activity   • Alcohol use: Yes     Comment: rare   • Drug use: Never       Medications:     Current Outpatient Medications:   •  cetirizine (ZyrTEC  "Allergy) 10 MG tablet, Take 1 tablet by mouth Every Night., Disp: 90 tablet, Rfl: 1  •  Desvenlafaxine Succinate ER 25 MG tablet sustained-release 24 hour, Take 1 tablet by mouth Daily., Disp: 90 tablet, Rfl: 1  •  Acidophilus Lactobacillus capsule, , Disp: , Rfl:   •  drospirenone-ethinyl estradiol (YAIMA) 3-0.02 MG per tablet, Take 1 tablet by mouth Daily., Disp: 90 tablet, Rfl: 3  •  fluticasone (FLONASE) 50 MCG/ACT nasal spray, 2 sprays into the nostril(s) as directed by provider Daily. 2 sprays each nostril daily, Disp: 18.2 mL, Rfl: 1    Allergies:   No Known Allergies        Objective     Physical Exam:  Vital Signs:   Vitals:    12/07/22 0731   BP: 125/85   Pulse: 67   Temp: 97.7 °F (36.5 °C)   SpO2: 99%   Weight: 80.7 kg (178 lb)   Height: 165.1 cm (65\")     Body mass index is 29.62 kg/m².     Physical Exam  HENT:      Right Ear: A middle ear effusion is present.      Left Ear: A middle ear effusion is present.      Nose: Congestion and rhinorrhea present.      Mouth/Throat:      Mouth: Mucous membranes are moist.      Comments: PND  Eyes:      Conjunctiva/sclera:      Right eye: Right conjunctiva is injected.      Left eye: Chemosis present.   Cardiovascular:      Rate and Rhythm: Normal rate and regular rhythm.      Heart sounds: Normal heart sounds. No murmur heard.  Pulmonary:      Effort: Pulmonary effort is normal.      Breath sounds: Normal breath sounds.   Lymphadenopathy:      Cervical: No cervical adenopathy.   Neurological:      Mental Status: She is alert.             Assessment / Plan      Assessment/Plan:   Diagnoses and all orders for this visit:    1. Menorrhagia with regular cycle (Primary)    2. Mild episode of recurrent major depressive disorder (HCC)    3. Seasonal allergies    4. COVID    5. Right ear pain    6. Sinus congestion    7. Mixed hyperlipidemia  -     Comprehensive Metabolic Panel; Future  -     Lipid Panel; Future    8. Abnormal serum iron level  -     Iron level; Future    9. " "Low serum T4 level  -     T4, Free    Other orders  -     Desvenlafaxine Succinate ER 25 MG tablet sustained-release 24 hour; Take 1 tablet by mouth Daily.  Dispense: 90 tablet; Refill: 1  -     cetirizine (ZyrTEC Allergy) 10 MG tablet; Take 1 tablet by mouth Every Night.  Dispense: 90 tablet; Refill: 1  -     fluticasone (FLONASE) 50 MCG/ACT nasal spray; 2 sprays into the nostril(s) as directed by provider Daily. 2 sprays each nostril daily  Dispense: 18.2 mL; Refill: 1      Menorrhagia with regular cycle stable at this time        depression controlled pristiq  Seasonal allergies uncontrolled will add zyrtec right ear pain sinus congestion post COVID if symptoms persist would add Singulair  Hyperlipidemia recommend decrease fried foods red meat pork products cheese increase fruits vegetables whole grains we will recheck a follow-up in 6 months  Abnormal elevated iron level we will recheck today call with results and further recommendations  Low T4 level we will recheck today call with results and further recommendations                                                                                                                                                                           Follow Up:   Return in about 6 months (around 6/7/2023).    Gabriela Paulino, APRN    \"Please note that portions of this note were completed with a voice recognition program.\"    "

## 2023-03-17 ENCOUNTER — CLINICAL SUPPORT (OUTPATIENT)
Dept: FAMILY MEDICINE CLINIC | Facility: CLINIC | Age: 38
End: 2023-03-17
Payer: COMMERCIAL

## 2023-03-17 DIAGNOSIS — E78.2 MIXED HYPERLIPIDEMIA: ICD-10-CM

## 2023-03-17 DIAGNOSIS — E86.0 DEHYDRATION, MODERATE: ICD-10-CM

## 2023-03-17 DIAGNOSIS — R79.0 ABNORMAL SERUM IRON LEVEL: ICD-10-CM

## 2023-03-17 LAB
ALBUMIN SERPL-MCNC: 4.4 G/DL (ref 3.5–5.2)
ALBUMIN/GLOB SERPL: 1.5 G/DL
ALP SERPL-CCNC: 51 U/L (ref 39–117)
ALT SERPL W P-5'-P-CCNC: 20 U/L (ref 1–33)
ANION GAP SERPL CALCULATED.3IONS-SCNC: 13.6 MMOL/L (ref 5–15)
AST SERPL-CCNC: 16 U/L (ref 1–32)
BASOPHILS # BLD AUTO: 0.04 10*3/MM3 (ref 0–0.2)
BASOPHILS NFR BLD AUTO: 0.6 % (ref 0–1.5)
BILIRUB SERPL-MCNC: 0.2 MG/DL (ref 0–1.2)
BUN SERPL-MCNC: 8 MG/DL (ref 6–20)
BUN/CREAT SERPL: 12.5 (ref 7–25)
CALCIUM SPEC-SCNC: 10.3 MG/DL (ref 8.6–10.5)
CHLORIDE SERPL-SCNC: 104 MMOL/L (ref 98–107)
CHOLEST SERPL-MCNC: 235 MG/DL (ref 0–200)
CO2 SERPL-SCNC: 24.4 MMOL/L (ref 22–29)
CREAT SERPL-MCNC: 0.64 MG/DL (ref 0.57–1)
DEPRECATED RDW RBC AUTO: 41.3 FL (ref 37–54)
EGFRCR SERPLBLD CKD-EPI 2021: 116.9 ML/MIN/1.73
EOSINOPHIL # BLD AUTO: 0.03 10*3/MM3 (ref 0–0.4)
EOSINOPHIL NFR BLD AUTO: 0.5 % (ref 0.3–6.2)
ERYTHROCYTE [DISTWIDTH] IN BLOOD BY AUTOMATED COUNT: 11.9 % (ref 12.3–15.4)
GLOBULIN UR ELPH-MCNC: 3 GM/DL
GLUCOSE SERPL-MCNC: 84 MG/DL (ref 65–99)
HCT VFR BLD AUTO: 38.9 % (ref 34–46.6)
HDLC SERPL-MCNC: 64 MG/DL (ref 40–60)
HGB BLD-MCNC: 13 G/DL (ref 12–15.9)
IMM GRANULOCYTES # BLD AUTO: 0.01 10*3/MM3 (ref 0–0.05)
IMM GRANULOCYTES NFR BLD AUTO: 0.2 % (ref 0–0.5)
IRON 24H UR-MRATE: 105 MCG/DL (ref 37–145)
LDLC SERPL CALC-MCNC: 144 MG/DL (ref 0–100)
LDLC/HDLC SERPL: 2.19 {RATIO}
LYMPHOCYTES # BLD AUTO: 2.03 10*3/MM3 (ref 0.7–3.1)
LYMPHOCYTES NFR BLD AUTO: 31.7 % (ref 19.6–45.3)
MCH RBC QN AUTO: 31.6 PG (ref 26.6–33)
MCHC RBC AUTO-ENTMCNC: 33.4 G/DL (ref 31.5–35.7)
MCV RBC AUTO: 94.4 FL (ref 79–97)
MONOCYTES # BLD AUTO: 0.39 10*3/MM3 (ref 0.1–0.9)
MONOCYTES NFR BLD AUTO: 6.1 % (ref 5–12)
NEUTROPHILS NFR BLD AUTO: 3.9 10*3/MM3 (ref 1.7–7)
NEUTROPHILS NFR BLD AUTO: 60.9 % (ref 42.7–76)
NRBC BLD AUTO-RTO: 0 /100 WBC (ref 0–0.2)
PLATELET # BLD AUTO: 386 10*3/MM3 (ref 140–450)
PMV BLD AUTO: 9.8 FL (ref 6–12)
POTASSIUM SERPL-SCNC: 4.8 MMOL/L (ref 3.5–5.2)
PROT SERPL-MCNC: 7.4 G/DL (ref 6–8.5)
RBC # BLD AUTO: 4.12 10*6/MM3 (ref 3.77–5.28)
SODIUM SERPL-SCNC: 142 MMOL/L (ref 136–145)
T4 FREE SERPL-MCNC: 0.91 NG/DL (ref 0.93–1.7)
TRIGL SERPL-MCNC: 153 MG/DL (ref 0–150)
VLDLC SERPL-MCNC: 27 MG/DL (ref 5–40)
WBC NRBC COR # BLD: 6.4 10*3/MM3 (ref 3.4–10.8)

## 2023-03-17 PROCEDURE — 85025 COMPLETE CBC W/AUTO DIFF WBC: CPT | Performed by: NURSE PRACTITIONER

## 2023-03-17 PROCEDURE — 83540 ASSAY OF IRON: CPT | Performed by: NURSE PRACTITIONER

## 2023-03-17 PROCEDURE — 80061 LIPID PANEL: CPT | Performed by: NURSE PRACTITIONER

## 2023-03-17 PROCEDURE — 84439 ASSAY OF FREE THYROXINE: CPT | Performed by: NURSE PRACTITIONER

## 2023-03-17 PROCEDURE — 36415 COLL VENOUS BLD VENIPUNCTURE: CPT | Performed by: NURSE PRACTITIONER

## 2023-03-17 PROCEDURE — 80053 COMPREHEN METABOLIC PANEL: CPT | Performed by: NURSE PRACTITIONER

## 2023-03-23 ENCOUNTER — OFFICE VISIT (OUTPATIENT)
Dept: FAMILY MEDICINE CLINIC | Facility: CLINIC | Age: 38
End: 2023-03-23
Payer: COMMERCIAL

## 2023-03-23 VITALS
HEIGHT: 65 IN | DIASTOLIC BLOOD PRESSURE: 88 MMHG | TEMPERATURE: 97.8 F | WEIGHT: 178 LBS | OXYGEN SATURATION: 99 % | SYSTOLIC BLOOD PRESSURE: 135 MMHG | HEART RATE: 75 BPM | BODY MASS INDEX: 29.66 KG/M2

## 2023-03-23 DIAGNOSIS — E78.2 MIXED HYPERLIPIDEMIA: ICD-10-CM

## 2023-03-23 DIAGNOSIS — R53.83 FATIGUE, UNSPECIFIED TYPE: ICD-10-CM

## 2023-03-23 DIAGNOSIS — D64.9 ANEMIA, UNSPECIFIED TYPE: ICD-10-CM

## 2023-03-23 DIAGNOSIS — J30.2 SEASONAL ALLERGIES: ICD-10-CM

## 2023-03-23 DIAGNOSIS — R40.0 DAYTIME SLEEPINESS: ICD-10-CM

## 2023-03-23 DIAGNOSIS — Z13.29 SCREENING FOR THYROID DISORDER: ICD-10-CM

## 2023-03-23 DIAGNOSIS — N92.0 MENORRHAGIA WITH REGULAR CYCLE: ICD-10-CM

## 2023-03-23 DIAGNOSIS — F33.0 MILD EPISODE OF RECURRENT MAJOR DEPRESSIVE DISORDER: ICD-10-CM

## 2023-03-23 DIAGNOSIS — F51.01 PRIMARY INSOMNIA: ICD-10-CM

## 2023-03-23 RX ORDER — FLUTICASONE PROPIONATE 50 MCG
2 SPRAY, SUSPENSION (ML) NASAL DAILY
Qty: 18.2 ML | Refills: 1 | Status: SHIPPED | OUTPATIENT
Start: 2023-03-23

## 2023-03-23 RX ORDER — CETIRIZINE HYDROCHLORIDE 10 MG/1
10 TABLET ORAL NIGHTLY
Qty: 90 TABLET | Refills: 1 | Status: SHIPPED | OUTPATIENT
Start: 2023-03-23

## 2023-03-23 RX ORDER — LEVONORGESTREL / ETHINYL ESTRADIOL AND ETHINYL ESTRADIOL 150-30(84)
1 KIT ORAL DAILY
Qty: 90 EACH | Refills: 3 | Status: SHIPPED | OUTPATIENT
Start: 2023-03-23

## 2023-03-23 RX ORDER — PHENOL 1.4 %
10 AEROSOL, SPRAY (ML) MUCOUS MEMBRANE NIGHTLY
Qty: 90 TABLET | Refills: 1 | Status: SHIPPED | OUTPATIENT
Start: 2023-03-23

## 2023-03-23 RX ORDER — DESVENLAFAXINE 25 MG/1
25 TABLET, EXTENDED RELEASE ORAL DAILY
Qty: 90 TABLET | Refills: 1 | Status: SHIPPED | OUTPATIENT
Start: 2023-03-23

## 2023-03-23 NOTE — PROGRESS NOTES
Follow Up Office Visit      Patient Name: Francine Calderon  : 1985   MRN: [id number]     Chief Complaint:    Chief Complaint   Patient presents with   • Allergies   • Hyperlipidemia   • Anemia   • Depression       History of Present Illness: Francine Calderon is a 37 y.o. female who is here today to follow up for hyperlipidemia, depression, anemia, allergies.  Review lab results     Labs-3/2023  Pap-2022  lmp- 3/22/2023    C/o heavy irregular cycle would like to change BCP    Pt reports eating a lot of cheese   Nonsmoker  No etoh  Walking most days per week     Also c/o fatigue, daytime sleepiness, insomnia, trouble falling asleep       Subjective      Review of Systems:   Review of Systems   Constitutional: Positive for fatigue. Negative for fever.   HENT: Negative for ear pain and sore throat.    Respiratory: Negative for cough.    Cardiovascular: Negative for chest pain.   Gastrointestinal: Negative for abdominal pain, diarrhea, nausea and vomiting.   Genitourinary: Positive for menstrual problem. Negative for dysuria.   Psychiatric/Behavioral: Positive for sleep disturbance.        Past Medical History: No past medical history on file.    Past Surgical History:   Past Surgical History:   Procedure Laterality Date   • BACK SURGERY      to repair scoliosis       Family History: No family history on file.    Social History:   Social History     Socioeconomic History   • Marital status: Single   Tobacco Use   • Smoking status: Never   • Smokeless tobacco: Never   Vaping Use   • Vaping Use: Never used   Substance and Sexual Activity   • Alcohol use: Yes     Comment: rare   • Drug use: Never       Medications:     Current Outpatient Medications:   •  Acidophilus Lactobacillus capsule, , Disp: , Rfl:   •  cetirizine (ZyrTEC Allergy) 10 MG tablet, Take 1 tablet by mouth Every Night., Disp: 90 tablet, Rfl: 1  •  Desvenlafaxine Succinate ER 25 MG tablet sustained-release 24 hour, Take 1 tablet by mouth Daily.,  "Disp: 90 tablet, Rfl: 1  •  fluticasone (FLONASE) 50 MCG/ACT nasal spray, 2 sprays into the nostril(s) as directed by provider Daily. 2 sprays each nostril daily, Disp: 18.2 mL, Rfl: 1  •  Levonorgest-Eth Estrad 91-Day (Seasonique) 0.15-0.03 &0.01 MG tablet, Take 1 tablet by mouth Daily., Disp: 90 each, Rfl: 3  •  Melatonin 10 MG tablet, Take 1 tablet by mouth Every Night., Disp: 90 tablet, Rfl: 1    Allergies:   No Known Allergies        Objective     Physical Exam:  Vital Signs:   Vitals:    03/23/23 0722   BP: 135/88   Pulse: 75   Temp: 97.8 °F (36.6 °C)   SpO2: 99%   Weight: 80.7 kg (178 lb)   Height: 165.1 cm (65\")     Body mass index is 29.62 kg/m².     Physical Exam  HENT:      Nose: Nose normal.      Mouth/Throat:      Mouth: Mucous membranes are moist.   Cardiovascular:      Rate and Rhythm: Normal rate and regular rhythm.      Heart sounds: Normal heart sounds. No murmur heard.  Pulmonary:      Effort: Pulmonary effort is normal.      Breath sounds: Normal breath sounds.   Abdominal:      General: Bowel sounds are normal.      Palpations: Abdomen is soft.   Musculoskeletal:      Right lower leg: No edema.      Left lower leg: No edema.   Skin:     General: Skin is warm and dry.   Neurological:      Mental Status: She is alert.   Psychiatric:         Mood and Affect: Mood normal.         Behavior: Behavior normal.             Assessment / Plan      Assessment/Plan:   Diagnoses and all orders for this visit:    1. Mixed hyperlipidemia  -     Lipid Panel; Future  -     Comprehensive Metabolic Panel; Future    2. Anemia, unspecified type  -     Iron Profile; Future  -     Ferritin; Future  -     CBC Auto Differential; Future    3. Mild episode of recurrent major depressive disorder (HCC)    4. Menorrhagia with regular cycle    5. Seasonal allergies    6. Fatigue, unspecified type  -     Ambulatory Referral to Sleep Medicine  -     Thyroid Stimulating Immunoglobulin; Future  -     Thyroid Peroxidase Antibody; " "Future    7. Primary insomnia  -     Ambulatory Referral to Sleep Medicine    8. Daytime sleepiness  -     Ambulatory Referral to Sleep Medicine    9. Screening for thyroid disorder  -     TSH; Future  -     T4, Free; Future    Other orders  -     cetirizine (ZyrTEC Allergy) 10 MG tablet; Take 1 tablet by mouth Every Night.  Dispense: 90 tablet; Refill: 1  -     Desvenlafaxine Succinate ER 25 MG tablet sustained-release 24 hour; Take 1 tablet by mouth Daily.  Dispense: 90 tablet; Refill: 1  -     fluticasone (FLONASE) 50 MCG/ACT nasal spray; 2 sprays into the nostril(s) as directed by provider Daily. 2 sprays each nostril daily  Dispense: 18.2 mL; Refill: 1  -     Levonorgest-Eth Estrad 91-Day (Seasonique) 0.15-0.03 &0.01 MG tablet; Take 1 tablet by mouth Daily.  Dispense: 90 each; Refill: 3  -     Melatonin 10 MG tablet; Take 1 tablet by mouth Every Night.  Dispense: 90 tablet; Refill: 1       Hyperlipidemia recommend reduce fried foods red meat pork products cheese increase fruits vegetables whole grains and increase exercise to 30 minutes daily we will recheck in 3 months  Anemia iron level currently normal no supplementation recommended at this time  Depression currently stable on Pristiq will provide refills  Menorrhagia with regular cycles will change to Seasonique did discuss IUD in the future if fails Seasonique we will follow-up in 3 months  Seasonal allergies currently controlled with Flonase Zyrtec will provide refills  Fatigue insomnia daytime sleepiness will refer to the sleep study try melatonin recommend good sleep hygiene avoiding caffeine after 2 to 3 PM exercising 30 minutes daily        Follow Up:   Return in about 3 months (around 6/23/2023).    Gabriela Paulino, CATHY    \"Please note that portions of this note were completed with a voice recognition program.\"    "

## 2023-06-30 ENCOUNTER — TELEPHONE (OUTPATIENT)
Dept: FAMILY MEDICINE CLINIC | Facility: CLINIC | Age: 38
End: 2023-06-30

## 2023-06-30 NOTE — TELEPHONE ENCOUNTER
Caller: Francine Calderon    Relationship to patient: Self    Best call back number: [phone]     Chief complaint: TROUBLE BREATHING, NUMBNESS/TINGLING, FEELING OF HEART RACING    Patient directed to call 911 or go to their nearest emergency room.     Patient verbalized understanding: [x] Yes  [] No  If no, why?    Additional notes:PATIENT STATES SHE IS HEADED TO THE ER AT [facility].

## 2023-07-18 PROBLEM — R03.0 ELEVATED BLOOD PRESSURE READING: Status: ACTIVE | Noted: 2023-07-18

## 2023-07-18 PROBLEM — F41.0 PANIC ATTACK AS REACTION TO STRESS: Status: ACTIVE | Noted: 2023-07-18

## 2023-07-18 PROBLEM — F41.9 ANXIETY: Status: ACTIVE | Noted: 2023-07-18

## 2023-07-18 PROBLEM — E66.09 CLASS 1 OBESITY DUE TO EXCESS CALORIES WITH SERIOUS COMORBIDITY AND BODY MASS INDEX (BMI) OF 30.0 TO 30.9 IN ADULT: Status: ACTIVE | Noted: 2023-07-18

## 2023-07-18 PROBLEM — F43.0 PANIC ATTACK AS REACTION TO STRESS: Status: ACTIVE | Noted: 2023-07-18

## 2023-07-18 PROBLEM — E66.811 CLASS 1 OBESITY DUE TO EXCESS CALORIES WITH SERIOUS COMORBIDITY AND BODY MASS INDEX (BMI) OF 30.0 TO 30.9 IN ADULT: Status: ACTIVE | Noted: 2023-07-18

## 2023-07-18 PROBLEM — R55 NEAR SYNCOPE: Status: ACTIVE | Noted: 2023-07-18

## 2023-08-18 ENCOUNTER — OFFICE VISIT (OUTPATIENT)
Dept: FAMILY MEDICINE CLINIC | Facility: CLINIC | Age: 38
End: 2023-08-18
Payer: COMMERCIAL

## 2023-08-18 VITALS
HEART RATE: 76 BPM | BODY MASS INDEX: 30.79 KG/M2 | DIASTOLIC BLOOD PRESSURE: 87 MMHG | HEIGHT: 65 IN | TEMPERATURE: 97.7 F | SYSTOLIC BLOOD PRESSURE: 136 MMHG | WEIGHT: 184.8 LBS | OXYGEN SATURATION: 100 %

## 2023-08-18 DIAGNOSIS — R30.0 BURNING WITH URINATION: Primary | ICD-10-CM

## 2023-08-18 DIAGNOSIS — R30.0 DYSURIA: ICD-10-CM

## 2023-08-18 PROBLEM — N89.8 VAGINAL DISCHARGE: Status: ACTIVE | Noted: 2023-08-18

## 2023-08-18 LAB
BILIRUB BLD-MCNC: NEGATIVE MG/DL
CANDIDA SPECIES: NEGATIVE
CLARITY, POC: CLEAR
COLOR UR: YELLOW
EXPIRATION DATE: ABNORMAL
GARDNERELLA VAGINALIS: POSITIVE
GLUCOSE UR STRIP-MCNC: NEGATIVE MG/DL
KETONES UR QL: ABNORMAL
LEUKOCYTE EST, POC: NEGATIVE
Lab: ABNORMAL
NITRITE UR-MCNC: NEGATIVE MG/ML
PH UR: 6 [PH] (ref 5–8)
PROT UR STRIP-MCNC: NEGATIVE MG/DL
RBC # UR STRIP: NEGATIVE /UL
SP GR UR: 1.02 (ref 1–1.03)
T VAGINALIS DNA VAG QL PROBE+SIG AMP: NEGATIVE
UROBILINOGEN UR QL: ABNORMAL

## 2023-08-18 PROCEDURE — 81003 URINALYSIS AUTO W/O SCOPE: CPT | Performed by: FAMILY MEDICINE

## 2023-08-18 PROCEDURE — 87510 GARDNER VAG DNA DIR PROBE: CPT | Performed by: FAMILY MEDICINE

## 2023-08-18 PROCEDURE — 87660 TRICHOMONAS VAGIN DIR PROBE: CPT | Performed by: FAMILY MEDICINE

## 2023-08-18 PROCEDURE — 87480 CANDIDA DNA DIR PROBE: CPT | Performed by: FAMILY MEDICINE

## 2023-08-18 PROCEDURE — 99213 OFFICE O/P EST LOW 20 MIN: CPT | Performed by: FAMILY MEDICINE

## 2023-08-18 RX ORDER — METRONIDAZOLE 500 MG/1
500 TABLET ORAL 2 TIMES DAILY
Qty: 14 TABLET | Refills: 0 | Status: SHIPPED | OUTPATIENT
Start: 2023-08-18

## 2023-08-18 NOTE — ASSESSMENT & PLAN NOTE
Her discharge may be more the cause of her symptoms then a UTI.  We will get a vaginal swab to assess this further.

## 2023-08-18 NOTE — PROGRESS NOTES
"I, Neal Ball MD saw the patient with the resident, and agree with the resident's findings and plan of care as documented in the resident's note.  /84 (BP Location: Right arm, Patient Position: Sitting, Cuff Size: Adult Regular)   Pulse 85   Ht 1.581 m (5' 2.25\")   Wt 57.5 kg (126 lb 11.2 oz)   SpO2 98%   BMI 22.99 kg/m    I personally reviewed vital signs and past record.  Key findings: patient here for a pre-op for planned line placement, and that was done.  However:    Problem List as of 8/2/2023 Reviewed: 8/2/2023  8:27 PM by Neal Ball MD            Noted    1. Erythromelalgia (H) 3/25/2021     Overview Addendum 7/11/2023  2:10 PM by Neal Ball MD      Feet very painful and bad enough to ulcerate and wound.  (also has lesser problems with hands and face)  3/6/2023 back on steroids (pulse, then BIW).  Takes venlafaxine.  Has been on rituximab, imuran, IVIG, ASA, mexiletine  Improved after high-dose intermittent steroids, then worsened.  Dx with testing by Dr. Shannon De La Rosa at [facility]. Also seeing Dr. Mook James @ [facility]  6/26/23: derm biopsy @ [place] showed changes consistent with healing skin.          Last Assessment & Plan 8/2/2023 Office Visit Written 8/2/2023  8:26 PM by Neal Ball MD      Erythromelalgia       UPDATE: Still has been planning to do pheresis, but considering otherwise. Had a surprising improvement in EM pain and function with her pneumonia, and then worsened somewhat, and since has improved more. Has 'bad days' a few per week still. Still doing nighttime cooling, but doesn't need to do it during the day. Walked in here.       ASSESSMENT: Difficult decision. The steroid timeline now is roughly equivalent to what happened before, assuming that all of the other meds weren't very contributory. Maybe even including IVIG. My opinion is that the initial pheresis was meant to be a therapeutic trial ... So it seems suboptimal to do it " Chief Complaint   Patient presents with    Vaginal Discharge     Burning with urination , pain after urination         Subjective     Francine Calderon  has a past medical history of Scoliosis.    Dysuria-she states for the past 2 weeks she has had some dysuria frequency and urgency but no gross hematuria.  Along with that she has also had a vaginal discharge.  She has had a white thick cloudy thick vaginal discharge.  It has not been itchy.      PHQ-2 Depression Screening  Little interest or pleasure in doing things?     Feeling down, depressed, or hopeless?     PHQ-2 Total Score     PHQ-9 Depression Screening  Little interest or pleasure in doing things?     Feeling down, depressed, or hopeless?     Trouble falling or staying asleep, or sleeping too much?     Feeling tired or having little energy?     Poor appetite or overeating?     Feeling bad about yourself - or that you are a failure or have let yourself or your family down?     Trouble concentrating on things, such as reading the newspaper or watching television?     Moving or speaking so slowly that other people could have noticed? Or the opposite - being so fidgety or restless that you have been moving around a lot more than usual?     Thoughts that you would be better off dead, or of hurting yourself in some way?     PHQ-9 Total Score     If you checked off any problems, how difficult have these problems made it for you to do your work, take care of things at home, or get along with other people?       No Known Allergies    Prior to Admission medications    Medication Sig Start Date End Date Taking? Authorizing Provider   busPIRone (BUSPAR) 5 MG tablet Take 1 tablet by mouth 3 (Three) Times a Day. 7/18/23  Yes Bre Paulino APRN   cetirizine (ZyrTEC Allergy) 10 MG tablet Take 1 tablet by mouth Every Night. 7/18/23  Yes Bre Paulino APRN   Desvenlafaxine Succinate ER (PRISTIQ) 50 MG 24 hr tablet Take 1 tablet by mouth Daily. 7/18/23   "now, when she is improving anyway. Better to wean the steroids as planned, and follow her course into the [facility]. In this case we shouldn't do the catheter now, to reduce general risk.       PLAN: She will check with Dr. James for next steps.             ADDENDUM:   CT follow-up today shows several areas of what are likely to be improving ground-glass opacities, including some wispy areas. I have a message out to discuss with the radiologist, but to me this seems unlikely to be malignancy. Even so, I plan to do a with-contrast CT when enough time has passed for the pneumonia effect to be gone.    Will send this myChart    Hello,  It was great to see you both today. Please send me an update when you decide about the catheter and/or plasmapheresis.  The CT looks ok. There are signs of the old pneumonia, which isn't surprising given the timing. I have a message out to the radiologist to ask a specific question or two. We will need to do another CT later this year to be certain.  Also ... As of this message there is a likely typo in the report where it says:  No advanced degenerative changes her other suspicious sclerotic or lytic/destructive abnormalities.  I suspect the \"changes her\" should be \"changes or,\" and asked the radiologist. If the word changes is a noun or a verb, will make a big difference!  Stay tuned, and keep up the good work.  Rob Ball MD  Internal Medicine & Pediatrics  [facility], [facility]     Time note (e5, 40'): The total of my time (on the date of service) for this service was 55 minutes, including discussion/face-to-face, chart review, interpretation not otherwise reported, documentation, and updating of the computerized record.    " Yes Bre Paulino APRN   fluticasone (FLONASE) 50 MCG/ACT nasal spray 2 sprays into the nostril(s) as directed by provider Daily. 2 sprays each nostril daily 7/18/23  Yes Bre Paulino APRN   Levonorgest-Eth Estrad 91-Day (Seasonique) 0.15-0.03 &0.01 MG tablet Take 1 tablet by mouth Daily. 3/23/23  Yes Bre Paulino APRN   Acidophilus Lactobacillus capsule  3/1/22 8/18/23  Provider, MD Stew        Patient Active Problem List   Diagnosis    Scoliosis    Anemia    Menorrhagia with regular cycle    Recurrent major depressive disorder    Mixed hyperlipidemia    Seasonal allergies    Fatigue    Primary insomnia    Daytime sleepiness    Near syncope    Anxiety    Elevated blood pressure reading    Panic attack as reaction to stress    Class 1 obesity due to excess calories with serious comorbidity and body mass index (BMI) of 30.0 to 30.9 in adult    Dysuria    Vaginal discharge        Past Surgical History:   Procedure Laterality Date    BACK SURGERY      to repair scoliosis       Social History     Socioeconomic History    Marital status: Single   Tobacco Use    Smoking status: Never    Smokeless tobacco: Never   Vaping Use    Vaping Use: Never used   Substance and Sexual Activity    Alcohol use: Yes     Comment: rare    Drug use: Never       History reviewed. No pertinent family history.    Family history, surgical history, past medical history, Allergies and meds reviewed with patient today and updated in [email or web address] EMR.     ROS:  Review of Systems   Constitutional:  Negative for chills and fever.   Gastrointestinal:  Negative for abdominal pain, constipation and diarrhea.   Genitourinary:  Positive for dysuria, frequency, urgency and vaginal discharge. Negative for difficulty urinating and hematuria.     OBJECTIVE:  Vitals:    08/18/23 1109   BP: 136/87   BP Location: Left arm   Patient Position: Sitting   Pulse: 76   Temp: 97.7 øF (36.5 øC)   SpO2: 100%   Weight: 83.8 kg (184 lb  "12.8 oz)   Height: 165.1 cm (65\")     No results found.   Body mass index is 30.75 kg/mý.  No LMP recorded.    Physical Exam  Vitals and nursing note reviewed.   Constitutional:       General: She is not in acute distress.     Appearance: Normal appearance. She is normal weight.   HENT:      Head: Normocephalic.   Cardiovascular:      Rate and Rhythm: Normal rate and regular rhythm.      Heart sounds: Normal heart sounds. No murmur heard.  Pulmonary:      Effort: Pulmonary effort is normal.      Breath sounds: Normal breath sounds. No wheezing.   Abdominal:      General: Abdomen is flat. Bowel sounds are normal.      Palpations: Abdomen is soft. There is no mass.      Tenderness: There is no abdominal tenderness.   Neurological:      Mental Status: She is alert and oriented to person, place, and time.   Psychiatric:         Mood and Affect: Mood normal.         Thought Content: Thought content normal.         Judgment: Judgment normal.       Procedures    Office Visit on 08/18/2023   Component Date Value Ref Range Status    Color 08/18/2023 Yellow  Yellow, Straw, Dark Yellow, Sara Final    Clarity, UA 08/18/2023 Clear  Clear Final    Specific Gravity  08/18/2023 1.025  1.005 - 1.030 Final    pH, Urine 08/18/2023 6.0  5.0 - 8.0 Final    Leukocytes 08/18/2023 Negative  Negative Final    Nitrite, UA 08/18/2023 Negative  Negative Final    Protein, POC 08/18/2023 Negative  Negative mg/dL Final    Glucose, UA 08/18/2023 Negative  Negative mg/dL Final    Ketones, UA 08/18/2023 Trace (A)  Negative Final    Urobilinogen, UA 08/18/2023 0.2 E.U./dL  Normal, 0.2 E.U./dL Final    Bilirubin 08/18/2023 Negative  Negative Final    Blood, UA 08/18/2023 Negative  Negative Final    Lot Number 08/18/2023 [id number]   Final    Expiration Date 08/18/2023 04/30/2024   Final       ASSESSMENT/ PLAN:    Diagnoses and all orders for this visit:    1. Burning with urination (Primary)  -     POCT urinalysis dipstick, automated  -     Gardnerella " vaginalis, Trichomonas vaginalis, Candida albicans, DNA - Swab, Vagina; Future  -     Gardnerella vaginalis, Trichomonas vaginalis, Candida albicans, DNA - Swab, Vagina    2. Dysuria  Assessment & Plan:  Her exam is rather unremarkable today as well as her urinalysis does not suggest a UTI.      Other orders  -     metroNIDAZOLE (Flagyl) 500 MG tablet; Take 1 tablet by mouth 2 (Two) Times a Day.  Dispense: 14 tablet; Refill: 0        Orders Placed Today:     New Medications Ordered This Visit   Medications    metroNIDAZOLE (Flagyl) 500 MG tablet     Sig: Take 1 tablet by mouth 2 (Two) Times a Day.     Dispense:  14 tablet     Refill:  0        Management Plan:     An After Visit Summary was printed and given to the patient at discharge.    Follow-up: Return if symptoms worsen or fail to improve.    Juanito Ramirez DO 8/18/2023 11:38 EDT  This note was electronically signed.

## 2023-10-20 RX ORDER — DESVENLAFAXINE SUCCINATE 50 MG/1
50 TABLET, EXTENDED RELEASE ORAL DAILY
Qty: 90 TABLET | Refills: 1 | Status: SHIPPED | OUTPATIENT
Start: 2023-10-20

## 2023-11-01 ENCOUNTER — OFFICE VISIT (OUTPATIENT)
Dept: FAMILY MEDICINE CLINIC | Facility: CLINIC | Age: 38
End: 2023-11-01
Payer: COMMERCIAL

## 2023-11-01 VITALS
DIASTOLIC BLOOD PRESSURE: 83 MMHG | TEMPERATURE: 96.7 F | BODY MASS INDEX: 31.32 KG/M2 | HEART RATE: 73 BPM | SYSTOLIC BLOOD PRESSURE: 131 MMHG | WEIGHT: 188 LBS | HEIGHT: 65 IN | OXYGEN SATURATION: 100 %

## 2023-11-01 DIAGNOSIS — M79.641 RIGHT HAND PAIN: ICD-10-CM

## 2023-11-01 DIAGNOSIS — R20.0 NUMBNESS AND TINGLING IN RIGHT HAND: Primary | ICD-10-CM

## 2023-11-01 DIAGNOSIS — R20.2 NUMBNESS AND TINGLING IN RIGHT HAND: Primary | ICD-10-CM

## 2023-11-01 RX ORDER — METHYLPREDNISOLONE 4 MG/1
TABLET ORAL
Qty: 1 EACH | Refills: 0 | Status: SHIPPED | OUTPATIENT
Start: 2023-11-01

## 2023-11-01 NOTE — PROGRESS NOTES
ACUTE VISIT     Patient Name: Francine Calderon  : 1985   MRN: [id number]     Chief Complaint:    Chief Complaint   Patient presents with    Hand Pain       History of Present Illness: Francine Calderon is a 38 y.o. female who is here today for right hand pain, numbness, tingling, difficulty gripping.      No recent imaging. She has been wearing a brace.   She has been dealing with this for a few years, but it has gotten worse and she has been doing more with her hands. States recently packed, cleaned, and moved from her apartment to another apartment       Subjective      Review of Systems:   Review of Systems   Constitutional:  Negative for fever.   Respiratory:  Negative for shortness of breath.    Cardiovascular:  Negative for chest pain.   Musculoskeletal:  Positive for arthralgias.   Neurological:  Positive for numbness.        Past Medical History:   Past Medical History:   Diagnosis Date    Scoliosis        Past Surgical History:   Past Surgical History:   Procedure Laterality Date    BACK SURGERY      to repair scoliosis       Family History: No family history on file.    Social History:   Social History     Socioeconomic History    Marital status: Single   Tobacco Use    Smoking status: Never    Smokeless tobacco: Never   Vaping Use    Vaping Use: Never used   Substance and Sexual Activity    Alcohol use: Yes     Comment: rare    Drug use: Never    Sexual activity: Defer       Medications:     Current Outpatient Medications:     cetirizine (ZyrTEC Allergy) 10 MG tablet, Take 1 tablet by mouth Every Night., Disp: 90 tablet, Rfl: 1    desvenlafaxine (PRISTIQ) 50 MG 24 hr tablet, TAKE 1 TABLET BY MOUTH DAILY, Disp: 90 tablet, Rfl: 1    fluticasone (FLONASE) 50 MCG/ACT nasal spray, 2 sprays into the nostril(s) as directed by provider Daily. 2 sprays each nostril daily, Disp: 18.2 mL, Rfl: 1    Levonorgest-Eth Estrad -Day (Seasonique) 0.15-0.03 &0.01 MG tablet, Take 1 tablet by mouth Daily., Disp: 90  "each, Rfl: 3    methylPREDNISolone (MEDROL) 4 MG dose pack, Take as directed on package instructions., Disp: 1 each, Rfl: 0    Allergies:   No Known Allergies      Objective     Physical Exam:  Vital Signs:   Vitals:    11/01/23 1333   BP: 131/83   Pulse: 73   Temp: 96.7 °F (35.9 °C)   SpO2: 100%   Weight: 85.3 kg (188 lb)   Height: 165.1 cm (65\")     Body mass index is 31.28 kg/m².     Physical Exam  Cardiovascular:      Rate and Rhythm: Normal rate and regular rhythm.      Heart sounds: Normal heart sounds. No murmur heard.  Pulmonary:      Effort: Pulmonary effort is normal.      Breath sounds: Normal breath sounds.   Musculoskeletal:      Comments: Positive tinels and phalens right hand   Skin:     General: Skin is warm and dry.      Capillary Refill: Capillary refill takes less than 2 seconds.   Neurological:      Mental Status: She is alert.             Assessment / Plan      Assessment/Plan:   Diagnoses and all orders for this visit:    1. Numbness and tingling in right hand (Primary)  -     EMG & Nerve Conduction Test; Future    2. Right hand pain  -     EMG & Nerve Conduction Test; Future    Other orders  -     methylPREDNISolone (MEDROL) 4 MG dose pack; Take as directed on package instructions.  Dispense: 1 each; Refill: 0         Numbness and tingling in right  hand recommended continue wrist brace with metal stay during the day and while sleeping  Right hand pain will start medrol dose pack       Follow Up:   Return if symptoms worsen or fail to improve.    CATHY Mohr      Please note that portions of this note were completed with a voice recognition program.  "

## 2023-11-06 ENCOUNTER — TELEPHONE (OUTPATIENT)
Dept: FAMILY MEDICINE CLINIC | Facility: CLINIC | Age: 38
End: 2023-11-06

## 2023-11-06 NOTE — TELEPHONE ENCOUNTER
Patient is aware and spoke understanding that [facility] does not to nerve conduction test.

## 2023-11-06 NOTE — TELEPHONE ENCOUNTER
Caller: Francine Calderon    Relationship: Self    Best call back number: [phone]    What was the call regarding: PATIENT IS CHECKING ON THE TEST THAT WAS ORDER FOR HER HAND/WRIST AS SHE HAS NOT HEARD ANYTHING ABOUT SCHEDULING THIS TEST WITH HOSPITAL. SHE IS NOT SURE IF SHE CAN GET THIS DONE AT [facility] SOONER, BUT WOULD LIKE TO IF SHE COULD.

## 2023-11-07 ENCOUNTER — PATIENT MESSAGE (OUTPATIENT)
Dept: FAMILY MEDICINE CLINIC | Facility: CLINIC | Age: 38
End: 2023-11-07
Payer: COMMERCIAL

## 2023-11-07 DIAGNOSIS — R20.2 NUMBNESS AND TINGLING IN RIGHT HAND: Primary | ICD-10-CM

## 2023-11-07 DIAGNOSIS — R20.0 NUMBNESS AND TINGLING IN RIGHT HAND: Primary | ICD-10-CM

## 2023-11-07 DIAGNOSIS — M79.641 RIGHT HAND PAIN: ICD-10-CM

## 2023-11-07 NOTE — TELEPHONE ENCOUNTER
Hub ok to relay message:Called patient to let her know new orders have been placed and she may call [phone] option 3

## 2023-11-09 DIAGNOSIS — R20.2 NUMBNESS AND TINGLING IN RIGHT HAND: Primary | ICD-10-CM

## 2023-11-09 DIAGNOSIS — R20.0 NUMBNESS AND TINGLING IN RIGHT HAND: Primary | ICD-10-CM

## 2023-12-14 ENCOUNTER — OFFICE VISIT (OUTPATIENT)
Dept: FAMILY MEDICINE CLINIC | Facility: CLINIC | Age: 38
End: 2023-12-14
Payer: COMMERCIAL

## 2023-12-14 VITALS
WEIGHT: 186 LBS | HEIGHT: 65 IN | BODY MASS INDEX: 30.99 KG/M2 | DIASTOLIC BLOOD PRESSURE: 84 MMHG | HEART RATE: 67 BPM | TEMPERATURE: 97 F | SYSTOLIC BLOOD PRESSURE: 126 MMHG | OXYGEN SATURATION: 99 %

## 2023-12-14 DIAGNOSIS — R52 BODY ACHES: ICD-10-CM

## 2023-12-14 DIAGNOSIS — K52.9 GASTROENTERITIS: ICD-10-CM

## 2023-12-14 DIAGNOSIS — M54.50 ACUTE MIDLINE LOW BACK PAIN WITHOUT SCIATICA: ICD-10-CM

## 2023-12-14 DIAGNOSIS — R51.9 ACUTE NONINTRACTABLE HEADACHE, UNSPECIFIED HEADACHE TYPE: Primary | ICD-10-CM

## 2023-12-14 DIAGNOSIS — R19.7 DIARRHEA, UNSPECIFIED TYPE: ICD-10-CM

## 2023-12-14 DIAGNOSIS — R11.0 NAUSEA: ICD-10-CM

## 2023-12-14 LAB
BILIRUB BLD-MCNC: NEGATIVE MG/DL
CLARITY, POC: ABNORMAL
COLOR UR: YELLOW
EXPIRATION DATE: ABNORMAL
EXPIRATION DATE: NORMAL
FLUAV AG UPPER RESP QL IA.RAPID: NOT DETECTED
FLUBV AG UPPER RESP QL IA.RAPID: NOT DETECTED
GLUCOSE UR STRIP-MCNC: NEGATIVE MG/DL
INTERNAL CONTROL: NORMAL
KETONES UR QL: ABNORMAL
LEUKOCYTE EST, POC: NEGATIVE
Lab: ABNORMAL
Lab: NORMAL
NITRITE UR-MCNC: NEGATIVE MG/ML
PH UR: 6 [PH] (ref 5–8)
PROT UR STRIP-MCNC: NEGATIVE MG/DL
RBC # UR STRIP: NEGATIVE /UL
SARS-COV-2 AG UPPER RESP QL IA.RAPID: NOT DETECTED
SP GR UR: 1.02 (ref 1–1.03)
UROBILINOGEN UR QL: ABNORMAL

## 2023-12-14 RX ORDER — ONDANSETRON 4 MG/1
8 TABLET, ORALLY DISINTEGRATING ORAL EVERY 8 HOURS PRN
Qty: 30 TABLET | Refills: 0 | Status: SHIPPED | OUTPATIENT
Start: 2023-12-14

## 2023-12-14 NOTE — LETTER
December 14, 2023     Patient: Francine Calderon   YOB: 1985   Date of Visit: 12/14/2023       To Whom It May Concern:    It is my medical opinion that Francine Calderon may return to work Monday 12/18/2023. She was seen in our office 12/14/2023.           Sincerely,        CATHY Gaviria    CC:   No Recipients

## 2023-12-14 NOTE — PROGRESS NOTES
ACUTE VISIT     Patient Name: Francine Calderon  : 1985   MRN: [id number]     Chief Complaint:    Chief Complaint   Patient presents with    Headache    Vomiting    Diarrhea       History of Present Illness: Francine Calderon is a 38 y.o. female who is here today for headache    Sudden onset of headache on Tuesday, throbbing    vomiting, diarrhea yesterday none today   She denies any fever, chills, sore throat.  Feels aching sore all over and fatigue   Sleeping since Tuesday when she got home from work   Feels her low back is aching because she has been laying in bed for 2 days    Subjective      Review of Systems:   Review of Systems   Constitutional:  Positive for fatigue. Negative for chills and fever.   HENT:  Negative for ear pain and sore throat.    Respiratory:  Negative for cough.    Cardiovascular:  Negative for chest pain.   Gastrointestinal:  Positive for diarrhea, nausea and vomiting. Negative for abdominal pain.   Genitourinary:  Negative for dysuria.   Musculoskeletal:  Positive for arthralgias and myalgias.   Neurological:  Positive for headaches.        Past Medical History:   Past Medical History:   Diagnosis Date    Scoliosis        Past Surgical History:   Past Surgical History:   Procedure Laterality Date    BACK SURGERY      to repair scoliosis       Family History: History reviewed. No pertinent family history.    Social History:   Social History     Socioeconomic History    Marital status: Single   Tobacco Use    Smoking status: Never     Passive exposure: Never    Smokeless tobacco: Never   Vaping Use    Vaping Use: Never used   Substance and Sexual Activity    Alcohol use: Yes     Comment: rare    Drug use: Never    Sexual activity: Defer       Medications:     Current Outpatient Medications:     cetirizine (ZyrTEC Allergy) 10 MG tablet, Take 1 tablet by mouth Every Night., Disp: 90 tablet, Rfl: 1    desvenlafaxine (PRISTIQ) 50 MG 24 hr tablet, TAKE 1 TABLET BY MOUTH DAILY, Disp: 90  "tablet, Rfl: 1    fluticasone (FLONASE) 50 MCG/ACT nasal spray, 2 sprays into the nostril(s) as directed by provider Daily. 2 sprays each nostril daily, Disp: 18.2 mL, Rfl: 1    Levonorgest-Eth Estrad 91-Day (Seasonique) 0.15-0.03 &0.01 MG tablet, Take 1 tablet by mouth Daily., Disp: 90 each, Rfl: 3    ondansetron ODT (ZOFRAN-ODT) 4 MG disintegrating tablet, Place 2 tablets on the tongue Every 8 (Eight) Hours As Needed for Nausea or Vomiting., Disp: 30 tablet, Rfl: 0    Allergies:   No Known Allergies      Objective     Physical Exam:  Vital Signs:   Vitals:    12/14/23 0848   BP: 126/84   Pulse: 67   Temp: 97 °F (36.1 °C)   SpO2: 99%   Weight: 84.4 kg (186 lb)   Height: 165.1 cm (65\")     Body mass index is 30.95 kg/m².     Physical Exam  HENT:      Right Ear: Tympanic membrane normal.      Left Ear: Tympanic membrane normal.      Nose: Nose normal.      Mouth/Throat:      Mouth: Mucous membranes are moist.   Eyes:      Conjunctiva/sclera: Conjunctivae normal.   Cardiovascular:      Rate and Rhythm: Normal rate and regular rhythm.      Heart sounds: Normal heart sounds. No murmur heard.  Pulmonary:      Effort: Pulmonary effort is normal.      Breath sounds: Normal breath sounds.   Abdominal:      General: Bowel sounds are normal.      Palpations: Abdomen is soft.   Lymphadenopathy:      Cervical: No cervical adenopathy.   Skin:     General: Skin is warm and dry.   Neurological:      Mental Status: She is alert.           Assessment / Plan      Assessment/Plan:   Diagnoses and all orders for this visit:    1. Acute nonintractable headache, unspecified headache type (Primary)  -     POCT SARS-CoV-2 Antigen CADEN + Flu    2. Nausea  -     POCT SARS-CoV-2 Antigen CADEN + Flu    3. Diarrhea, unspecified type  -     POCT SARS-CoV-2 Antigen CADEN + Flu    4. Body aches  -     POCT SARS-CoV-2 Antigen CADEN + Flu    5. Acute midline low back pain without sciatica  -     POC Urinalysis Dipstick, Automated    6. " Gastroenteritis    Other orders  -     ondansetron ODT (ZOFRAN-ODT) 4 MG disintegrating tablet; Place 2 tablets on the tongue Every 8 (Eight) Hours As Needed for Nausea or Vomiting.  Dispense: 30 tablet; Refill: 0         Due to headache with bodyaches low back pain nausea flu COVID-negative  Nausea and diarrhea we will start Zofran recommend clear liquids advance diet as tolerated  Low back pain UA negative  Headache recommend increase water intake and Tylenol OTC as needed full this is a gastrointestinal virus      Follow Up:   Return if symptoms worsen or fail to improve.    CATHY Mohr      Please note that portions of this note were completed with a voice recognition program.

## 2024-03-06 RX ORDER — LEVONORGESTREL / ETHINYL ESTRADIOL AND ETHINYL ESTRADIOL 150-30(84)
1 KIT ORAL DAILY
Qty: 90 EACH | Refills: 3 | Status: SHIPPED | OUTPATIENT
Start: 2024-03-06

## 2024-03-06 NOTE — TELEPHONE ENCOUNTER
Caller: Francine Calderon    Relationship: Self    Best call back number: [phone]     Requested Prescriptions:   Requested Prescriptions     Pending Prescriptions Disp Refills    Levonorgest-Eth Estrad 91-Day (Seasonique) 0.15-0.03 &0.01 MG 90 each 3     Sig: Take 1 tablet by mouth Daily.        Pharmacy where request should be sent: [id number] - [place], [place] - [address] AT [facility] [phone] [facility] [phone] FX     Last office visit with prescribing clinician: 12/14/2023   Last telemedicine visit with prescribing clinician: Visit date not found   Next office visit with prescribing clinician: Visit date not found     Additional details provided by patient:     Does the patient have less than a 3 day supply:  [] Yes  [x] No    Would you like a call back once the refill request has been completed: [] Yes [] No    If the office needs to give you a call back, can they leave a voicemail: [] Yes [] No    Nya Dolan, PCT   03/06/24 08:26 EST

## 2024-03-21 ENCOUNTER — OFFICE VISIT (OUTPATIENT)
Dept: FAMILY MEDICINE CLINIC | Facility: CLINIC | Age: 39
End: 2024-03-21
Payer: COMMERCIAL

## 2024-03-21 VITALS
HEART RATE: 69 BPM | SYSTOLIC BLOOD PRESSURE: 132 MMHG | WEIGHT: 191 LBS | BODY MASS INDEX: 31.82 KG/M2 | HEIGHT: 65 IN | TEMPERATURE: 97.9 F | OXYGEN SATURATION: 100 % | DIASTOLIC BLOOD PRESSURE: 90 MMHG

## 2024-03-21 DIAGNOSIS — Z79.899 MEDICATION MANAGEMENT: ICD-10-CM

## 2024-03-21 DIAGNOSIS — N94.6 MENSES PAINFUL: ICD-10-CM

## 2024-03-21 DIAGNOSIS — Z12.4 SCREENING FOR MALIGNANT NEOPLASM OF CERVIX: ICD-10-CM

## 2024-03-21 DIAGNOSIS — N92.1 MENORRHAGIA WITH IRREGULAR CYCLE: ICD-10-CM

## 2024-03-21 DIAGNOSIS — Z01.419 ENCOUNTER FOR WELL WOMAN EXAM WITH ROUTINE GYNECOLOGICAL EXAM: ICD-10-CM

## 2024-03-21 LAB
AMPHET+METHAMPHET UR QL: NEGATIVE
AMPHETAMINE INTERNAL CONTROL: NORMAL
AMPHETAMINES UR QL: NEGATIVE
BARBITURATE INTERNAL CONTROL: NORMAL
BARBITURATES UR QL SCN: NEGATIVE
BASOPHILS # BLD AUTO: 0.05 10*3/MM3 (ref 0–0.2)
BASOPHILS NFR BLD AUTO: 0.8 % (ref 0–1.5)
BENZODIAZ UR QL SCN: NEGATIVE
BENZODIAZEPINE INTERNAL CONTROL: NORMAL
BUPRENORPHINE INTERNAL CONTROL: NORMAL
BUPRENORPHINE SERPL-MCNC: NEGATIVE NG/ML
CANNABINOIDS SERPL QL: NEGATIVE
COCAINE INTERNAL CONTROL: NORMAL
COCAINE UR QL: NEGATIVE
DEPRECATED RDW RBC AUTO: 43 FL (ref 37–54)
EOSINOPHIL # BLD AUTO: 0.08 10*3/MM3 (ref 0–0.4)
EOSINOPHIL NFR BLD AUTO: 1.3 % (ref 0.3–6.2)
ERYTHROCYTE [DISTWIDTH] IN BLOOD BY AUTOMATED COUNT: 12.4 % (ref 12.3–15.4)
EXPIRATION DATE: NORMAL
FSH SERPL-ACNC: 8.61 MIU/ML
HCT VFR BLD AUTO: 39.3 % (ref 34–46.6)
HGB BLD-MCNC: 12.6 G/DL (ref 12–15.9)
IMM GRANULOCYTES # BLD AUTO: 0.01 10*3/MM3 (ref 0–0.05)
IMM GRANULOCYTES NFR BLD AUTO: 0.2 % (ref 0–0.5)
LH SERPL-ACNC: 1.45 MIU/ML
LYMPHOCYTES # BLD AUTO: 2.88 10*3/MM3 (ref 0.7–3.1)
LYMPHOCYTES NFR BLD AUTO: 45.6 % (ref 19.6–45.3)
Lab: NORMAL
MCH RBC QN AUTO: 30.3 PG (ref 26.6–33)
MCHC RBC AUTO-ENTMCNC: 32.1 G/DL (ref 31.5–35.7)
MCV RBC AUTO: 94.5 FL (ref 79–97)
MDMA (ECSTASY) INTERNAL CONTROL: NORMAL
MDMA UR QL SCN: NEGATIVE
METHADONE INTERNAL CONTROL: NORMAL
METHADONE UR QL SCN: NEGATIVE
METHAMPHETAMINE INTERNAL CONTROL: NORMAL
MONOCYTES # BLD AUTO: 0.45 10*3/MM3 (ref 0.1–0.9)
MONOCYTES NFR BLD AUTO: 7.1 % (ref 5–12)
MORPHINE INTERNAL CONTROL: NORMAL
MORPHINE/OPIATES SCREEN, URINE: NEGATIVE
NEUTROPHILS NFR BLD AUTO: 2.85 10*3/MM3 (ref 1.7–7)
NEUTROPHILS NFR BLD AUTO: 45 % (ref 42.7–76)
NRBC BLD AUTO-RTO: 0 /100 WBC (ref 0–0.2)
OXYCODONE INTERNAL CONTROL: NORMAL
OXYCODONE UR QL SCN: NEGATIVE
PCP UR QL SCN: NEGATIVE
PHENCYCLIDINE INTERNAL CONTROL: NORMAL
PLATELET # BLD AUTO: 403 10*3/MM3 (ref 140–450)
PMV BLD AUTO: 10 FL (ref 6–12)
PROGEST SERPL-MCNC: <0.05 NG/ML
PROLACTIN SERPL-MCNC: 20.8 NG/ML (ref 4.79–23.3)
RBC # BLD AUTO: 4.16 10*6/MM3 (ref 3.77–5.28)
THC INTERNAL CONTROL: NORMAL
WBC NRBC COR # BLD AUTO: 6.32 10*3/MM3 (ref 3.4–10.8)

## 2024-03-21 PROCEDURE — 87624 HPV HI-RISK TYP POOLED RSLT: CPT | Performed by: NURSE PRACTITIONER

## 2024-03-21 PROCEDURE — 84144 ASSAY OF PROGESTERONE: CPT | Performed by: NURSE PRACTITIONER

## 2024-03-21 PROCEDURE — 84146 ASSAY OF PROLACTIN: CPT | Performed by: NURSE PRACTITIONER

## 2024-03-21 PROCEDURE — G0123 SCREEN CERV/VAG THIN LAYER: HCPCS | Performed by: NURSE PRACTITIONER

## 2024-03-21 PROCEDURE — 85025 COMPLETE CBC W/AUTO DIFF WBC: CPT | Performed by: NURSE PRACTITIONER

## 2024-03-21 PROCEDURE — 83002 ASSAY OF GONADOTROPIN (LH): CPT | Performed by: NURSE PRACTITIONER

## 2024-03-21 PROCEDURE — 83001 ASSAY OF GONADOTROPIN (FSH): CPT | Performed by: NURSE PRACTITIONER

## 2024-03-21 PROCEDURE — 82672 ASSAY OF ESTROGEN: CPT | Performed by: NURSE PRACTITIONER

## 2024-03-21 RX ORDER — TRAMADOL HYDROCHLORIDE 50 MG/1
50 TABLET ORAL EVERY 6 HOURS PRN
Qty: 60 TABLET | Refills: 0 | Status: SHIPPED | OUTPATIENT
Start: 2024-03-21

## 2024-03-21 NOTE — PROGRESS NOTES
Patient Name: Francine Calderon  : 1985   MRN: [id number]     Chief Complaint:    Chief Complaint   Patient presents with    Menorrhagia    Gynecologic Exam       History of Present Illness: Francine Calderon is a 38 y.o. female who is here today for heavy menses       She says 1 day a week for the last month she has had vaginal bleeding.  She started this cycle Saturday and she has had heavy bleeding every day. She is bleeding through a super plus tampon and maxi mad every 2 hours.  She says she has had heavy periods since she started at the age of 12.  She has been on Seasonique birth control for at least a year.    Patient complain these periods are very painful with radiation to the pelvis has tried Tylenol ibuprofen Aleve Midol without relief requesting pain medication at this time  Lmp-3/16/24  Pap-2022  Labs-2022    Mom started menopause at 42, concerned that is what is happened, wants her hormone checked today       Subjective      Review of Systems:   Review of Systems   Constitutional:  Negative for fever.   Respiratory:  Negative for cough.    Cardiovascular:  Negative for chest pain.   Gastrointestinal:  Positive for abdominal pain. Negative for constipation, diarrhea, nausea and vomiting.   Genitourinary:  Positive for menstrual problem and pelvic pain. Negative for dysuria.        Past Medical History:   Past Medical History:   Diagnosis Date    Scoliosis        Past Surgical History:   Past Surgical History:   Procedure Laterality Date    BACK SURGERY      to repair scoliosis       Family History: No family history on file.    Social History:   Social History     Socioeconomic History    Marital status: Single   Tobacco Use    Smoking status: Never     Passive exposure: Never    Smokeless tobacco: Never   Vaping Use    Vaping status: Never Used   Substance and Sexual Activity    Alcohol use: Yes     Comment: rare    Drug use: Never    Sexual activity: Defer       Medications:  "    Current Outpatient Medications:     cetirizine (ZyrTEC Allergy) 10 MG tablet, Take 1 tablet by mouth Every Night., Disp: 90 tablet, Rfl: 1    desvenlafaxine (PRISTIQ) 50 MG 24 hr tablet, TAKE 1 TABLET BY MOUTH DAILY, Disp: 90 tablet, Rfl: 1    Levonorgest-Eth Estrad 91-Day (Seasonique) 0.15-0.03 &0.01 MG, Take 1 tablet by mouth Daily., Disp: 90 each, Rfl: 3    traMADol (ULTRAM) 50 MG tablet, Take 1 tablet by mouth Every 6 (Six) Hours As Needed for Moderate Pain., Disp: 60 tablet, Rfl: 0    Allergies:   No Known Allergies      Objective     Physical Exam:  Vital Signs:   Vitals:    03/21/24 1455   BP: 132/90   Pulse: 69   Temp: 97.9 °F (36.6 °C)   SpO2: 100%   Weight: 86.6 kg (191 lb)   Height: 165.1 cm (65\")     Body mass index is 31.78 kg/m².     Physical Exam  Cardiovascular:      Rate and Rhythm: Normal rate and regular rhythm.      Heart sounds: Normal heart sounds. No murmur heard.  Pulmonary:      Effort: Pulmonary effort is normal.      Breath sounds: Normal breath sounds.   Genitourinary:     General: Normal vulva.      Vagina: Bleeding present.      Cervix: Normal.      Uterus: Normal.       Adnexa: Right adnexa normal and left adnexa normal.      Rectum: Normal.   Skin:     General: Skin is warm and dry.   Neurological:      Mental Status: She is alert.             Assessment / Plan      Assessment/Plan:   Diagnoses and all orders for this visit:    1. Encounter for well woman exam with routine gynecological exam    2. Screening for malignant neoplasm of cervix  -     IgP, Aptima HPV    3. Menses painful  -     traMADol (ULTRAM) 50 MG tablet; Take 1 tablet by mouth Every 6 (Six) Hours As Needed for Moderate Pain.  Dispense: 60 tablet; Refill: 0    4. Menorrhagia with irregular cycle  -     US Non-ob Transvaginal; Future  -     Follicle Stimulating Hormone  -     Luteinizing Hormone  -     Prolactin  -     Estrogens, Total  -     Progesterone  -     CBC Auto Differential    5. Medication management  -   "   P[facility]  -     POC 12 Panel Urine Drug Screen         Counter for well woman exam Pap smear collected screening for cervical cancer  Painful menses with heavy irregular cycle will obtain labs pelvic ultrasound provide tramadol for pain has failed all OTC treatments Aaron reviewed urine drug screen obtained      Follow Up:   Return in about 1 year (around 3/21/2025).    CATHY Mohr      Please note that portions of this note were completed with a voice recognition program.

## 2024-03-22 DIAGNOSIS — N92.1 MENORRHAGIA WITH IRREGULAR CYCLE: ICD-10-CM

## 2024-03-25 ENCOUNTER — TELEPHONE (OUTPATIENT)
Dept: FAMILY MEDICINE CLINIC | Facility: CLINIC | Age: 39
End: 2024-03-25
Payer: COMMERCIAL

## 2024-03-25 DIAGNOSIS — N94.6 MENSES PAINFUL: ICD-10-CM

## 2024-03-25 DIAGNOSIS — N92.1 MENORRHAGIA WITH IRREGULAR CYCLE: Primary | ICD-10-CM

## 2024-03-25 LAB
CYTOLOGIST CVX/VAG CYTO: NORMAL
CYTOLOGY CVX/VAG DOC CYTO: NORMAL
CYTOLOGY CVX/VAG DOC THIN PREP: NORMAL
DX ICD CODE: NORMAL
HPV I/H RISK 4 DNA CVX QL PROBE+SIG AMP: NEGATIVE
Lab: NORMAL
OTHER STN SPEC: NORMAL
STAT OF ADQ CVX/VAG CYTO-IMP: NORMAL

## 2024-03-25 NOTE — TELEPHONE ENCOUNTER
Caller: Francine Calderon    Relationship: Self    Best call back number: [phone]     Caller requesting test results: YES    What test was performed: BLOOD WORK AND ULTRASOUND     Additional notes: PATIENT STATED THAT SHE HAS SEEN RESULTS ON MYCHART AND HAS QUESTIONS ABOUT THE ULTRASOUND RESULTS

## 2024-03-27 ENCOUNTER — TELEPHONE (OUTPATIENT)
Dept: FAMILY MEDICINE CLINIC | Facility: CLINIC | Age: 39
End: 2024-03-27
Payer: COMMERCIAL

## 2024-03-27 NOTE — TELEPHONE ENCOUNTER
Caller: Francine Calderon    Relationship: Self    Best call back number: [phone]     What is the best time to reach you: ANY    Who are you requesting to speak with (clinical staff, provider,  specific staff member): CLINICAL STAFF    What was the call regarding: PATIENT CALLED STATING THAT THE OBGYN SHE WAS REFERRED TO CANNOT GET HER IN FOR SOME TIME. SHE NEEDS TO HAVE [facility] RESEND THE REFERRAL WITH MORE INFORMATION REGARDING THE THYROID ON HER REFERRAL SO THEY CAN SEE HER MORE URGENTLY

## 2024-03-28 NOTE — TELEPHONE ENCOUNTER
Relay: left voicemail for patient. We routed this back to OB/GYN to see if the provider thinks she needs to be seen sooner. They will either notify us or her. If they tell us, we'll let her know.

## 2024-03-29 LAB — ESTROGEN SERPL-MCNC: 64 PG/ML

## 2024-04-11 ENCOUNTER — OFFICE VISIT (OUTPATIENT)
Dept: OBSTETRICS AND GYNECOLOGY | Facility: CLINIC | Age: 39
End: 2024-04-11
Payer: COMMERCIAL

## 2024-04-11 VITALS
DIASTOLIC BLOOD PRESSURE: 100 MMHG | HEIGHT: 65 IN | BODY MASS INDEX: 31.65 KG/M2 | HEART RATE: 81 BPM | SYSTOLIC BLOOD PRESSURE: 146 MMHG | WEIGHT: 190 LBS

## 2024-04-11 DIAGNOSIS — Z30.09 UNWANTED FERTILITY: ICD-10-CM

## 2024-04-11 DIAGNOSIS — N92.1 MENORRHAGIA WITH IRREGULAR CYCLE: Primary | ICD-10-CM

## 2024-04-11 DIAGNOSIS — N94.6 DYSMENORRHEA: ICD-10-CM

## 2024-04-11 DIAGNOSIS — D25.1 INTRAMURAL LEIOMYOMA OF UTERUS: ICD-10-CM

## 2024-04-11 PROCEDURE — 99204 OFFICE O/P NEW MOD 45 MIN: CPT | Performed by: STUDENT IN AN ORGANIZED HEALTH CARE EDUCATION/TRAINING PROGRAM

## 2024-04-11 PROCEDURE — 88305 TISSUE EXAM BY PATHOLOGIST: CPT | Performed by: STUDENT IN AN ORGANIZED HEALTH CARE EDUCATION/TRAINING PROGRAM

## 2024-04-11 RX ORDER — SODIUM CHLORIDE 0.9 % (FLUSH) 0.9 %
10 SYRINGE (ML) INJECTION AS NEEDED
OUTPATIENT
Start: 2024-04-11

## 2024-04-11 RX ORDER — SODIUM CHLORIDE 9 MG/ML
40 INJECTION, SOLUTION INTRAVENOUS AS NEEDED
OUTPATIENT
Start: 2024-04-11

## 2024-04-11 RX ORDER — SODIUM CHLORIDE 0.9 % (FLUSH) 0.9 %
3 SYRINGE (ML) INJECTION EVERY 12 HOURS SCHEDULED
OUTPATIENT
Start: 2024-04-11

## 2024-04-11 NOTE — PROGRESS NOTES
New GYN Problem Visit - Menorrhagia     Chief Complaint   Patient presents with    Menorrhagia           HPI  Francine Calderon is a 38 y.o. female, , who presents as a new gynecology patient.    The patient presents with concerns regarding heavy menstrual bleeding and potential fibroids. Her menstrual cycles have recently been irregular, with the most recent cycle commencing on either 2024 or 2024 and concluding on 2024. The initial week post-menstruation was characterized by spotting, and the subsequent two weeks were so heavy that she had profuse blood clots. The bleeding ceased 1 week ago; however, she has experienced a resurgence of pain, albeit less severe than during her bleeding episodes. She also reports sharp, lower back pain. Prior to this past month, her menstrual cycle did not persist for 1 month. She has been on birth control, which resulted in a menstrual cycle every three months due to the severity of her heavy periods. She has been on intermittent use of various birth controls since the age of 18, and has been on Seasonique for slightly over 1 year. She stopped this about a month ago. She has a history of bacterial vaginosis and has not tried Mirena or other birth control methods. An ultrasound ordered by Dr. Paulino revealed uterine fibroids, prompting her referral to our facility. She has a history of heavy menstrual cycles dating back to her childhood, with one miscarriage in 2021. Currently, she is not sexually active, has no desire for children, and denies any vaginal odor or discharge. She reports fluctuations in her menstrual cycle, with some months being heavier than others.  She denies any nipple discharge. She has not been sexually active for over 1 year and has not undergone sexually transmitted disease screening since . Dr. Paulino has discussed ablation as the next course of action if Seasonique proves ineffective. Genetic testing has not been offered to  "her. She has no history of abdominal surgeries.    She is on Pristiq for anxiety and depression. She takes tramadol for pain as needed. She takes ibuprofen when the pain is not too bad. She takes Zyrtec for allergies.    Additional OB/GYN History   Patient's last menstrual period was 03/05/2024 (exact date).  Current contraception: contraceptive methods: Abstinence  Desires to: change to sterilization for  contraception  Allergies : Patient has no known allergies.     Her mother and grandmother went into premenopausal in their early 40s. Her grandmother had cancer in her breasts and ovaries. Her paternal aunt had ovarian cancer. Every woman in her family has had fibroids that end up turning into something where they had to have the hysterectomies. Her mother had fibroids and had one ovary removed. She does not have any female siblings.    She denies any allergies to shellfish.    The additional following portions of the patient's history were reviewed and updated as appropriate: allergies, current medications, past family history, past medical history, past social history, past surgical history, and problem list.    Review of Systems   Constitutional:  Negative for fatigue and fever.   Respiratory:  Negative for cough and shortness of breath.    Cardiovascular:  Negative for chest pain.   Gastrointestinal:  Negative for abdominal distention and abdominal pain.   Genitourinary:  Positive for menstrual problem and pelvic pain. Negative for breast discharge, difficulty urinating, dysuria, vaginal discharge and vaginal pain.       I have reviewed and agree with the HPI, ROS, and historical information as entered above. Dusty Rivas MD    Objective   /100   Pulse 81   Ht 165.1 cm (65\")   Wt 86.2 kg (190 lb)   LMP 03/05/2024 (Exact Date)   BMI 31.62 kg/m²     Physical Exam  Vitals and nursing note reviewed. Exam conducted with a chaperone present.   Constitutional:       General: She is not in acute " distress.     Appearance: Normal appearance. She is not toxic-appearing.   HENT:      Head: Normocephalic and atraumatic.   Eyes:      Extraocular Movements: Extraocular movements intact.      Conjunctiva/sclera: Conjunctivae normal.   Cardiovascular:      Pulses: Normal pulses.   Pulmonary:      Effort: Pulmonary effort is normal.   Abdominal:      General: There is no distension.      Palpations: Abdomen is soft.      Tenderness: There is no abdominal tenderness.   Genitourinary:     General: Normal vulva.      Exam position: Lithotomy position.      Labia:         Right: No tenderness, lesion or injury.         Left: No tenderness, lesion or injury.       Vagina: Normal.      Cervix: Normal.   Musculoskeletal:      Cervical back: Normal range of motion.   Skin:     General: Skin is warm and dry.   Neurological:      Mental Status: She is alert and oriented to person, place, and time.   Psychiatric:         Mood and Affect: Mood normal.         Behavior: Behavior normal.         Thought Content: Thought content normal.       Endometrial Biopsy    Date/Time: 4/11/2024 11:57 AM    Performed by: Dusty Rivas MD  Authorized by: Dusty Rivas MD    Consent:     Consent obtained: verbal and written    Consent given by: patient    Risks discussed: bleeding, infection and pain    Patient agrees, verbalizes understanding, and wants to proceed: yes    Indications:     Indications: abnormal uterine bleeding    Pre-procedure:     Urine pregnancy test: negative    Procedure:     A bimanual exam was performed: no      Prepped with: Betadine    Tenaculum used: yes      A local block was performed: no      Cervix dilated: no      Number of passes: 1  Findings:     Cervix: normal      Uterus depth by sound (cm): 9.5    Specimen collected: specimen collected and sent to pathology      Patient tolerance: tolerated well, no immediate complications    Laboratory Studies  Pap smear was normal. Blood count showed a point drop in  blood count from 2023; however, did not show signs of anemia. Prolactin was normal.    Imaging  Ultrasound showed fibroids in the uterus.    Transvaginal ultrasound: 3/22/2024  Uterus measuring 6.9 x 4.8 x 4.3 cm.  Hypoechoic mass lesion in the posterior uterine myometrium at the fundus measuring 2.4 x 2.4 x 2.3 cm consistent with suspected posterior fibroid.  Endometrium is homogenous with an endometrial lining of 3 mm.  Nonvisualization of ovaries       Assessment and Plan  Francine Calderon is a 38 y.o.  presenting as a new gynecology patient with concerns for menorrhagia with irregular cycle.  Patient has been having management with primary care physician has done extensive laboratory workup.  This workup to date has not demonstrated any cause for abnormal uterine bleeding.  Most recent CBC at the end of last month was normal.  Patient did undergo a transvaginal ultrasound performed at outside facility.  This demonstrated an intramural fibroid with a normal endometrial thickness.  Patient is desiring surgical intervention in the form of ablative procedure as she is no longer desiring to continue medical therapy.  I discussed with patient conservative, medical as well as surgical interventions.  The risk benefits and alternatives of each were discussed.  Specifically in regards to patient desired sterilization and ablation, patient was quoted reduction in ovarian cancer risk with sterilization however nonreversible form of contraception.  Discussed with patient ablative procedure with patient satisfaction of 70 to 80% following procedure.  Risk of post ablative tubal syndrome was discussed.  Reoperation risk of 7 to 12% discussed with patient.  Discussed that following ablative procedure only option would be hysterectomy.  The risk of surgical intervention were discussed including bleeding, infection, injury surrounding structures, DVT/PE, death.  After discussion patient was desiring to move forward with  surgical intervention as such an endometrial biopsy was collected today in office to rule out malignancy for cause of abnormal bleeding.  Bleeding and infection precautions were provided.  Patient to follow-up in office 1 week prior to surgical intervention for preoperative appointment.    Diagnoses and all orders for this visit:    1. Menorrhagia with irregular cycle (Primary)  -     Case Request; Standing  -     sodium chloride 0.9 % flush 3 mL  -     sodium chloride 0.9 % flush 10 mL  -     sodium chloride 0.9 % infusion 40 mL  -     Case Request    2. Dysmenorrhea    3. Intramural leiomyoma of uterus    4. Unwanted fertility  -     Case Request; Standing  -     sodium chloride 0.9 % flush 3 mL  -     sodium chloride 0.9 % flush 10 mL  -     sodium chloride 0.9 % infusion 40 mL  -     Case Request    Other orders  -     Code Status and Medical Interventions:; Standing  -     Follow Anesthesia Guidelines / Protocol; Future  -     Follow Anesthesia Guidelines / Protocol; Standing  -     Chlorhexidine Skin Prep; Future  -     Obtain Informed Consent; Standing  -     Verify / Perform Chlorhexidine Skin Prep; Standing  -     CBC & Differential; Standing  -     Insert Peripheral IV; Standing  -     Saline Lock & Maintain IV Access; Standing  -     Place Sequential Compression Device; Standing  -     Maintain Sequential Compression Device; Standing  -     Endometrial Biopsy      Counseling:  Bleeding and infection precautions      She understands the importance of having the above orders performed in a timely fashion.  The risks of not performing them include, but are not limited to, cancer and/or subsequent increase in morbidity and/or mortality.  She is encouraged to review her results online and/or contact or office if she has questions.     Follow Up:  Return for 1 week prior to surgical intervention .        Dusty Rivas MD  04/11/2024    Transcribed from ambient dictation for Dusty Rivas MD by Raegan  Bryan.  04/11/24   12:27 EDT    Patient or patient representative verbalized consent to the visit recording.  I have personally performed the services described in this document as transcribed by the above individual, and it is both accurate and complete.

## 2024-04-12 ENCOUNTER — PATIENT ROUNDING (BHMG ONLY) (OUTPATIENT)
Dept: OBSTETRICS AND GYNECOLOGY | Facility: CLINIC | Age: 39
End: 2024-04-12
Payer: COMMERCIAL

## 2024-04-15 ENCOUNTER — TELEPHONE (OUTPATIENT)
Dept: OBSTETRICS AND GYNECOLOGY | Facility: CLINIC | Age: 39
End: 2024-04-15
Payer: COMMERCIAL

## 2024-04-15 LAB
CYTO UR: NORMAL
LAB AP CASE REPORT: NORMAL
LAB AP CLINICAL INFORMATION: NORMAL
PATH REPORT.FINAL DX SPEC: NORMAL
PATH REPORT.GROSS SPEC: NORMAL

## 2024-04-15 NOTE — TELEPHONE ENCOUNTER
Spoke with patient and gave results.     ----- Message from Dusty Rivas MD sent at 4/15/2024 10:33 AM EDT -----  Endometrial biopsy with no malignancy detected.

## 2024-04-25 ENCOUNTER — OFFICE VISIT (OUTPATIENT)
Dept: OBSTETRICS AND GYNECOLOGY | Facility: CLINIC | Age: 39
End: 2024-04-25
Payer: COMMERCIAL

## 2024-04-25 VITALS
HEIGHT: 65 IN | SYSTOLIC BLOOD PRESSURE: 134 MMHG | DIASTOLIC BLOOD PRESSURE: 80 MMHG | BODY MASS INDEX: 31.62 KG/M2 | HEART RATE: 87 BPM

## 2024-04-25 DIAGNOSIS — N94.6 DYSMENORRHEA: ICD-10-CM

## 2024-04-25 DIAGNOSIS — Z30.09 UNWANTED FERTILITY: ICD-10-CM

## 2024-04-25 DIAGNOSIS — N92.1 MENORRHAGIA WITH IRREGULAR CYCLE: Primary | ICD-10-CM

## 2024-04-25 PROCEDURE — 99214 OFFICE O/P EST MOD 30 MIN: CPT | Performed by: STUDENT IN AN ORGANIZED HEALTH CARE EDUCATION/TRAINING PROGRAM

## 2024-04-25 NOTE — PROGRESS NOTES
[facility]   Surgical preoperative HISTORY AND PHYSICAL    Patient Name: Francine Calderon  : 1985  MRN: [id number]  Primary Care Physician:  Bre Paulino APRN  Date of admission: (Not on file)    Subjective   Subjective     Chief Complaint: Menorrhagia with irregular menses    History of Present Illness  Francine Calderon is a 38 y.o.  presenting for preoperative evaluation.  Patient with history of irregular menstruations.  Patient declines medical management.  Desiring definitive management with an ablative procedure.  Patient also without good form of contraception and desiring no more future fertility.  Desires for sterilization.  Patient denies shortness of breath, chest pain, fevers or chills.  No changes to surgical history or medical history since previous evaluation.        Review of Systems   Constitutional: Negative.    Respiratory:  Negative for chest tightness, shortness of breath and wheezing.    Cardiovascular:  Negative for chest pain, palpitations and leg swelling.   Gastrointestinal:  Negative for abdominal pain, nausea and vomiting.   Genitourinary:  Positive for menstrual problem.   Musculoskeletal: Negative.    Skin: Negative.    Neurological: Negative.    Psychiatric/Behavioral: Negative.          Personal History     Past Medical History:   Diagnosis Date    Abnormal Pap smear of cervix     Anxiety     Depression     Herpes     HPV (human papilloma virus) infection     Scoliosis        Past Surgical History:   Procedure Laterality Date    BACK SURGERY      to repair scoliosis       Family History: family history includes Uterine cancer in her maternal grandmother and paternal aunt. Otherwise pertinent FHx was reviewed and not pertinent to current issue.    Social History:  reports that she has never smoked. She has never been exposed to tobacco smoke. She has never used smokeless tobacco. She reports current alcohol use. She reports that she does not use  drugs.    Home Medications:  Acidophilus, Levonorgest-Eth Estrad 91-Day, Multiple Vitamins-Calcium, cetirizine, and desvenlafaxine    Allergies:  No Known Allergies    Objective    Objective     Vitals:   Heart Rate:  [87] 87  BP: (134)/(80) 134/80    Physical Exam  Vitals and nursing note reviewed.   Constitutional:       General: She is not in acute distress.     Appearance: Normal appearance. She is not toxic-appearing.   HENT:      Head: Normocephalic and atraumatic.   Eyes:      Extraocular Movements: Extraocular movements intact.      Conjunctiva/sclera: Conjunctivae normal.   Cardiovascular:      Pulses: Normal pulses.   Pulmonary:      Effort: Pulmonary effort is normal.   Abdominal:      General: There is no distension.      Palpations: Abdomen is soft.      Tenderness: There is no abdominal tenderness.   Genitourinary:     Comments: Deferred  Musculoskeletal:      Cervical back: Normal range of motion.   Skin:     General: Skin is warm and dry.   Neurological:      Mental Status: She is alert and oriented to person, place, and time.   Psychiatric:         Mood and Affect: Mood normal.         Behavior: Behavior normal.         Thought Content: Thought content normal.         Result Review    [unfilled]    Assessment & Plan   Assessment / Plan     Brief Patient Summary:  Francine Calderon is a 38 y.o. female who preoperative appointment with desire for surgical management for irregular menstruations.  Risk benefits and alternatives surgery previously discussed with patient.  Endometrial biopsy obtained demonstrated secretory endometrium.  After discussion of procedure and expected outcomes patient desires to move forward with surgical intervention.  Patient was informed that ablative procedure may lessen menstruation flow however goal is not to make patient amenorrheic.  Also discussed risk of reoperation secondary to failure of ablative procedure.  Benefit of bilateral salpingectomy discussed in regards to  the decrease in ovarian risk.  Patient's questions answered.  Patient desires to move forward with surgical intervention.    Active Hospital Problems:  Patient Active Problem List   Diagnosis    Menorrhagia with irregular cycle    Menses painful    Unwanted fertility         Plan:   2 OR for for laparoscopic bilateral salpingectomy and NovaSure ablation  Prophylactic antibiotics not indicated      DVT prophylaxis:  SCDs for DVT prophylaxis    Risk benefits and alternatives of surgery were discussed with patient including risk of bleeding, infection, injury to surrounding structures, DVT/PE, death and complications from anesthesia.  After discussion patient desires to move forward with surgical intervention.    Dusty Rivas MD

## 2024-05-04 ENCOUNTER — ANESTHESIA EVENT (OUTPATIENT)
Dept: PERIOP | Facility: HOSPITAL | Age: 39
End: 2024-05-04
Payer: COMMERCIAL

## 2024-05-06 ENCOUNTER — HOSPITAL ENCOUNTER (OUTPATIENT)
Facility: HOSPITAL | Age: 39
Setting detail: HOSPITAL OUTPATIENT SURGERY
Discharge: HOME | End: 2024-05-06
Attending: STUDENT IN AN ORGANIZED HEALTH CARE EDUCATION/TRAINING PROGRAM | Admitting: STUDENT IN AN ORGANIZED HEALTH CARE EDUCATION/TRAINING PROGRAM
Payer: COMMERCIAL

## 2024-05-06 ENCOUNTER — ANESTHESIA (OUTPATIENT)
Dept: PERIOP | Facility: HOSPITAL | Age: 39
End: 2024-05-06
Payer: COMMERCIAL

## 2024-05-06 VITALS
TEMPERATURE: 97.5 F | BODY MASS INDEX: 31.96 KG/M2 | SYSTOLIC BLOOD PRESSURE: 107 MMHG | DIASTOLIC BLOOD PRESSURE: 77 MMHG | WEIGHT: 191.8 LBS | HEART RATE: 64 BPM | RESPIRATION RATE: 18 BRPM | HEIGHT: 65 IN | OXYGEN SATURATION: 100 %

## 2024-05-06 DIAGNOSIS — Z30.09 UNWANTED FERTILITY: ICD-10-CM

## 2024-05-06 DIAGNOSIS — Z98.890 S/P LAPAROSCOPY: Primary | ICD-10-CM

## 2024-05-06 DIAGNOSIS — N92.1 MENORRHAGIA WITH IRREGULAR CYCLE: ICD-10-CM

## 2024-05-06 LAB
B-HCG UR QL: NEGATIVE
BASOPHILS # BLD AUTO: 0.06 10*3/MM3 (ref 0–0.2)
BASOPHILS NFR BLD AUTO: 0.9 % (ref 0–1.5)
DEPRECATED RDW RBC AUTO: 46.5 FL (ref 37–54)
EOSINOPHIL # BLD AUTO: 0.09 10*3/MM3 (ref 0–0.4)
EOSINOPHIL NFR BLD AUTO: 1.3 % (ref 0.3–6.2)
ERYTHROCYTE [DISTWIDTH] IN BLOOD BY AUTOMATED COUNT: 13.1 % (ref 12.3–15.4)
HCT VFR BLD AUTO: 40.4 % (ref 34–46.6)
HGB BLD-MCNC: 13.2 G/DL (ref 12–15.9)
IMM GRANULOCYTES # BLD AUTO: 0.02 10*3/MM3 (ref 0–0.05)
IMM GRANULOCYTES NFR BLD AUTO: 0.3 % (ref 0–0.5)
LYMPHOCYTES # BLD AUTO: 2.52 10*3/MM3 (ref 0.7–3.1)
LYMPHOCYTES NFR BLD AUTO: 36.8 % (ref 19.6–45.3)
MCH RBC QN AUTO: 31.5 PG (ref 26.6–33)
MCHC RBC AUTO-ENTMCNC: 32.7 G/DL (ref 31.5–35.7)
MCV RBC AUTO: 96.4 FL (ref 79–97)
MONOCYTES # BLD AUTO: 0.45 10*3/MM3 (ref 0.1–0.9)
MONOCYTES NFR BLD AUTO: 6.6 % (ref 5–12)
NEUTROPHILS NFR BLD AUTO: 3.71 10*3/MM3 (ref 1.7–7)
NEUTROPHILS NFR BLD AUTO: 54.1 % (ref 42.7–76)
NRBC BLD AUTO-RTO: 0 /100 WBC (ref 0–0.2)
PLATELET # BLD AUTO: 341 10*3/MM3 (ref 140–450)
PMV BLD AUTO: 9.1 FL (ref 6–12)
RBC # BLD AUTO: 4.19 10*6/MM3 (ref 3.77–5.28)
WBC NRBC COR # BLD AUTO: 6.85 10*3/MM3 (ref 3.4–10.8)

## 2024-05-06 PROCEDURE — 88302 TISSUE EXAM BY PATHOLOGIST: CPT | Performed by: STUDENT IN AN ORGANIZED HEALTH CARE EDUCATION/TRAINING PROGRAM

## 2024-05-06 PROCEDURE — S0260 H&P FOR SURGERY: HCPCS | Performed by: STUDENT IN AN ORGANIZED HEALTH CARE EDUCATION/TRAINING PROGRAM

## 2024-05-06 PROCEDURE — 25010000002 ONDANSETRON PER 1 MG

## 2024-05-06 PROCEDURE — 25010000002 PROPOFOL 10 MG/ML EMULSION

## 2024-05-06 PROCEDURE — 25010000002 KETOROLAC TROMETHAMINE PER 15 MG

## 2024-05-06 PROCEDURE — 85025 COMPLETE CBC W/AUTO DIFF WBC: CPT | Performed by: STUDENT IN AN ORGANIZED HEALTH CARE EDUCATION/TRAINING PROGRAM

## 2024-05-06 PROCEDURE — 25010000002 FENTANYL CITRATE (PF) 50 MCG/ML SOLUTION

## 2024-05-06 PROCEDURE — 25010000002 SUGAMMADEX 200 MG/2ML SOLUTION

## 2024-05-06 PROCEDURE — 81025 URINE PREGNANCY TEST: CPT | Performed by: ANESTHESIOLOGY

## 2024-05-06 PROCEDURE — 25810000003 LACTATED RINGERS PER 1000 ML: Performed by: ANESTHESIOLOGY

## 2024-05-06 PROCEDURE — 25010000002 DEXAMETHASONE PER 1 MG

## 2024-05-06 PROCEDURE — 88305 TISSUE EXAM BY PATHOLOGIST: CPT | Performed by: STUDENT IN AN ORGANIZED HEALTH CARE EDUCATION/TRAINING PROGRAM

## 2024-05-06 PROCEDURE — 58661 LAPAROSCOPY REMOVE ADNEXA: CPT | Performed by: STUDENT IN AN ORGANIZED HEALTH CARE EDUCATION/TRAINING PROGRAM

## 2024-05-06 PROCEDURE — 58563 HYSTEROSCOPY ABLATION: CPT | Performed by: STUDENT IN AN ORGANIZED HEALTH CARE EDUCATION/TRAINING PROGRAM

## 2024-05-06 PROCEDURE — 25010000002 MIDAZOLAM PER 1MG: Performed by: ANESTHESIOLOGY

## 2024-05-06 RX ORDER — FENTANYL CITRATE 50 UG/ML
INJECTION, SOLUTION INTRAMUSCULAR; INTRAVENOUS AS NEEDED
Status: DISCONTINUED | OUTPATIENT
Start: 2024-05-06 | End: 2024-05-06 | Stop reason: SURG

## 2024-05-06 RX ORDER — IBUPROFEN 600 MG/1
600 TABLET ORAL EVERY 6 HOURS PRN
Qty: 20 TABLET | Refills: 0 | Status: SHIPPED | OUTPATIENT
Start: 2024-05-06 | End: 2024-05-11

## 2024-05-06 RX ORDER — OXYCODONE HYDROCHLORIDE 5 MG/1
5 TABLET ORAL
Status: DISCONTINUED | OUTPATIENT
Start: 2024-05-06 | End: 2024-05-06 | Stop reason: HOSPADM

## 2024-05-06 RX ORDER — MIDAZOLAM HYDROCHLORIDE 2 MG/2ML
2 INJECTION, SOLUTION INTRAMUSCULAR; INTRAVENOUS ONCE
Status: COMPLETED | OUTPATIENT
Start: 2024-05-06 | End: 2024-05-06

## 2024-05-06 RX ORDER — ACETAMINOPHEN 325 MG/1
650 TABLET ORAL ONCE
Status: DISCONTINUED | OUTPATIENT
Start: 2024-05-06 | End: 2024-05-06

## 2024-05-06 RX ORDER — SODIUM CHLORIDE 0.9 % (FLUSH) 0.9 %
3 SYRINGE (ML) INJECTION EVERY 12 HOURS SCHEDULED
Status: DISCONTINUED | OUTPATIENT
Start: 2024-05-06 | End: 2024-05-06 | Stop reason: HOSPADM

## 2024-05-06 RX ORDER — DEXAMETHASONE SODIUM PHOSPHATE 4 MG/ML
INJECTION, SOLUTION INTRA-ARTICULAR; INTRALESIONAL; INTRAMUSCULAR; INTRAVENOUS; SOFT TISSUE AS NEEDED
Status: DISCONTINUED | OUTPATIENT
Start: 2024-05-06 | End: 2024-05-06 | Stop reason: SURG

## 2024-05-06 RX ORDER — MAGNESIUM HYDROXIDE 1200 MG/15ML
LIQUID ORAL AS NEEDED
Status: DISCONTINUED | OUTPATIENT
Start: 2024-05-06 | End: 2024-05-06 | Stop reason: HOSPADM

## 2024-05-06 RX ORDER — PROMETHAZINE HYDROCHLORIDE 12.5 MG/1
12.5 TABLET ORAL ONCE AS NEEDED
Status: DISCONTINUED | OUTPATIENT
Start: 2024-05-06 | End: 2024-05-06 | Stop reason: HOSPADM

## 2024-05-06 RX ORDER — OXYCODONE HYDROCHLORIDE 5 MG/1
5 TABLET ORAL EVERY 6 HOURS PRN
Qty: 8 TABLET | Refills: 0 | Status: SHIPPED | OUTPATIENT
Start: 2024-05-06 | End: 2024-05-08

## 2024-05-06 RX ORDER — ONDANSETRON 2 MG/ML
4 INJECTION INTRAMUSCULAR; INTRAVENOUS ONCE AS NEEDED
Status: DISCONTINUED | OUTPATIENT
Start: 2024-05-06 | End: 2024-05-06 | Stop reason: HOSPADM

## 2024-05-06 RX ORDER — ACETAMINOPHEN 500 MG
1000 TABLET ORAL EVERY 6 HOURS
Qty: 40 TABLET | Refills: 0 | Status: SHIPPED | OUTPATIENT
Start: 2024-05-06 | End: 2024-05-11

## 2024-05-06 RX ORDER — PROMETHAZINE HYDROCHLORIDE 25 MG/1
25 SUPPOSITORY RECTAL ONCE AS NEEDED
Status: DISCONTINUED | OUTPATIENT
Start: 2024-05-06 | End: 2024-05-06 | Stop reason: HOSPADM

## 2024-05-06 RX ORDER — KETOROLAC TROMETHAMINE 30 MG/ML
INJECTION, SOLUTION INTRAMUSCULAR; INTRAVENOUS AS NEEDED
Status: DISCONTINUED | OUTPATIENT
Start: 2024-05-06 | End: 2024-05-06 | Stop reason: SURG

## 2024-05-06 RX ORDER — ONDANSETRON 2 MG/ML
INJECTION INTRAMUSCULAR; INTRAVENOUS AS NEEDED
Status: DISCONTINUED | OUTPATIENT
Start: 2024-05-06 | End: 2024-05-06 | Stop reason: SURG

## 2024-05-06 RX ORDER — LIDOCAINE HYDROCHLORIDE AND EPINEPHRINE 10; 10 MG/ML; UG/ML
INJECTION, SOLUTION INFILTRATION; PERINEURAL AS NEEDED
Status: DISCONTINUED | OUTPATIENT
Start: 2024-05-06 | End: 2024-05-06 | Stop reason: HOSPADM

## 2024-05-06 RX ORDER — MEPERIDINE HYDROCHLORIDE 25 MG/ML
12.5 INJECTION INTRAMUSCULAR; INTRAVENOUS; SUBCUTANEOUS
Status: DISCONTINUED | OUTPATIENT
Start: 2024-05-06 | End: 2024-05-06 | Stop reason: HOSPADM

## 2024-05-06 RX ORDER — PROPOFOL 10 MG/ML
VIAL (ML) INTRAVENOUS AS NEEDED
Status: DISCONTINUED | OUTPATIENT
Start: 2024-05-06 | End: 2024-05-06 | Stop reason: SURG

## 2024-05-06 RX ORDER — DEXMEDETOMIDINE HYDROCHLORIDE 100 UG/ML
INJECTION, SOLUTION INTRAVENOUS AS NEEDED
Status: DISCONTINUED | OUTPATIENT
Start: 2024-05-06 | End: 2024-05-06 | Stop reason: SURG

## 2024-05-06 RX ORDER — SODIUM CHLORIDE 0.9 % (FLUSH) 0.9 %
10 SYRINGE (ML) INJECTION AS NEEDED
Status: DISCONTINUED | OUTPATIENT
Start: 2024-05-06 | End: 2024-05-06 | Stop reason: HOSPADM

## 2024-05-06 RX ORDER — SODIUM CHLORIDE 9 MG/ML
40 INJECTION, SOLUTION INTRAVENOUS AS NEEDED
Status: DISCONTINUED | OUTPATIENT
Start: 2024-05-06 | End: 2024-05-06 | Stop reason: HOSPADM

## 2024-05-06 RX ORDER — PROMETHAZINE HYDROCHLORIDE 12.5 MG/1
25 TABLET ORAL ONCE AS NEEDED
Status: DISCONTINUED | OUTPATIENT
Start: 2024-05-06 | End: 2024-05-06 | Stop reason: HOSPADM

## 2024-05-06 RX ORDER — ROCURONIUM BROMIDE 10 MG/ML
INJECTION, SOLUTION INTRAVENOUS AS NEEDED
Status: DISCONTINUED | OUTPATIENT
Start: 2024-05-06 | End: 2024-05-06 | Stop reason: SURG

## 2024-05-06 RX ORDER — SODIUM CHLORIDE, SODIUM LACTATE, POTASSIUM CHLORIDE, CALCIUM CHLORIDE 600; 310; 30; 20 MG/100ML; MG/100ML; MG/100ML; MG/100ML
9 INJECTION, SOLUTION INTRAVENOUS CONTINUOUS PRN
Status: DISCONTINUED | OUTPATIENT
Start: 2024-05-06 | End: 2024-05-06 | Stop reason: HOSPADM

## 2024-05-06 RX ORDER — LIDOCAINE HYDROCHLORIDE 20 MG/ML
INJECTION, SOLUTION EPIDURAL; INFILTRATION; INTRACAUDAL; PERINEURAL AS NEEDED
Status: DISCONTINUED | OUTPATIENT
Start: 2024-05-06 | End: 2024-05-06 | Stop reason: SURG

## 2024-05-06 RX ORDER — ACETAMINOPHEN 500 MG
1000 TABLET ORAL ONCE
Status: COMPLETED | OUTPATIENT
Start: 2024-05-06 | End: 2024-05-06

## 2024-05-06 RX ADMIN — ONDANSETRON HYDROCHLORIDE 4 MG: 2 SOLUTION INTRAMUSCULAR; INTRAVENOUS at 07:30

## 2024-05-06 RX ADMIN — ROCURONIUM BROMIDE 50 MG: 10 INJECTION, SOLUTION INTRAVENOUS at 07:24

## 2024-05-06 RX ADMIN — FENTANYL CITRATE 50 MCG: 50 INJECTION, SOLUTION INTRAMUSCULAR; INTRAVENOUS at 07:24

## 2024-05-06 RX ADMIN — KETOROLAC TROMETHAMINE 30 MG: 30 INJECTION, SOLUTION INTRAMUSCULAR; INTRAVENOUS at 07:58

## 2024-05-06 RX ADMIN — FENTANYL CITRATE 50 MCG: 50 INJECTION, SOLUTION INTRAMUSCULAR; INTRAVENOUS at 07:46

## 2024-05-06 RX ADMIN — DEXMEDETOMIDINE 10 MCG: 100 INJECTION, SOLUTION INTRAVENOUS at 07:45

## 2024-05-06 RX ADMIN — SUGAMMADEX 200 MG: 100 INJECTION, SOLUTION INTRAVENOUS at 08:17

## 2024-05-06 RX ADMIN — SODIUM CHLORIDE, POTASSIUM CHLORIDE, SODIUM LACTATE AND CALCIUM CHLORIDE 9 ML/HR: 600; 310; 30; 20 INJECTION, SOLUTION INTRAVENOUS at 06:44

## 2024-05-06 RX ADMIN — MIDAZOLAM HYDROCHLORIDE 2 MG: 1 INJECTION, SOLUTION INTRAMUSCULAR; INTRAVENOUS at 07:05

## 2024-05-06 RX ADMIN — PROPOFOL 150 MG: 10 INJECTION, EMULSION INTRAVENOUS at 07:24

## 2024-05-06 RX ADMIN — LIDOCAINE HYDROCHLORIDE 50 MG: 20 INJECTION, SOLUTION INTRAVENOUS at 07:24

## 2024-05-06 RX ADMIN — DEXAMETHASONE SODIUM PHOSPHATE 8 MG: 4 INJECTION, SOLUTION INTRAMUSCULAR; INTRAVENOUS at 07:30

## 2024-05-06 RX ADMIN — ACETAMINOPHEN 1000 MG: 500 TABLET ORAL at 06:43

## 2024-05-22 ENCOUNTER — OFFICE VISIT (OUTPATIENT)
Dept: OBSTETRICS AND GYNECOLOGY | Facility: CLINIC | Age: 39
End: 2024-05-22
Payer: COMMERCIAL

## 2024-05-22 VITALS
SYSTOLIC BLOOD PRESSURE: 154 MMHG | HEART RATE: 78 BPM | DIASTOLIC BLOOD PRESSURE: 84 MMHG | WEIGHT: 191 LBS | HEIGHT: 65 IN | BODY MASS INDEX: 31.82 KG/M2

## 2024-05-22 DIAGNOSIS — Z09 POSTOPERATIVE FOLLOW-UP: Primary | ICD-10-CM

## 2024-05-22 PROBLEM — N92.0 MENORRHAGIA WITH REGULAR CYCLE: Status: RESOLVED | Noted: 2022-11-04 | Resolved: 2024-05-22

## 2024-05-22 PROBLEM — Z30.09 UNWANTED FERTILITY: Status: RESOLVED | Noted: 2024-04-11 | Resolved: 2024-05-22

## 2024-05-22 PROBLEM — N92.1 MENORRHAGIA WITH IRREGULAR CYCLE: Status: RESOLVED | Noted: 2024-03-21 | Resolved: 2024-05-22

## 2024-05-22 PROBLEM — N89.8 VAGINAL DISCHARGE: Status: RESOLVED | Noted: 2023-08-18 | Resolved: 2024-05-22

## 2024-05-22 PROBLEM — N94.6 MENSES PAINFUL: Status: RESOLVED | Noted: 2024-03-21 | Resolved: 2024-05-22

## 2024-05-22 PROCEDURE — 99024 POSTOP FOLLOW-UP VISIT: CPT | Performed by: STUDENT IN AN ORGANIZED HEALTH CARE EDUCATION/TRAINING PROGRAM

## 2024-05-22 RX ORDER — LORATADINE 10 MG/1
TABLET ORAL
COMMUNITY
Start: 2024-05-06

## 2024-05-22 NOTE — PROGRESS NOTES
"Post Operative Visit        Chief Complaint   Patient presents with    Post-op     HPI: Francine Calderon is a 38 y.o.  status post bilateral salpingectomy and NovaSure ablation on 2024.    Pain:  no  Vaginal bleeding:  no  Vaginal discharge:   slight discharge; no odor or irritation   Fever/chills:  no  Good appetite:  yes  Normal bladder function:  yes  Normal bowel function:  yes    PHYSICAL EXAM:  /84   Pulse 78   Ht 165.1 cm (65\")   Wt 86.6 kg (191 lb)   LMP 2024 (Exact Date)   BMI 31.78 kg/m²   General- NAD, alert and oriented, appropriate  Psych- Normal mood, good memory  Incisions - C/D/I - healing well   Abdomen- Soft, non distended, non tender, no masses  Pelvic -patient deferred  Skin- No lesions, no rashes    ASSESSMENT and PLAN:  Post-operative exam .  Patient now postoperative day #16.   Diagnoses and all orders for this visit:    1. Postoperative follow-up (Primary)        Operative report, surgical findings and any pathology/photos reviewed.  All questions answered.     Counseling:   Ok to resume normal activities  Ok to resume intercourse  Return to school/work without limitations  PAP smear frequency normal Pap smear 3/21/2024 with high risk HPV negative.  ASCCP recommendation for 5-year interval.  TRACK MENSES, RTO if <q21 days (frequent) or >q3mo (infrequent IF not on hormonal BC), >7d long, heavy, or painful.      Follow Up:  Return if symptoms worsen or fail to improve.          Dusty Rivas MD  2024    [facility]  [facility] [place] [address]  Dept: [phone]  Dept Fax: [phone]  Loc: [phone]  Loc Fax: [phone]       "

## 2024-06-12 NOTE — PROGRESS NOTES
Patient Name: Francine Calderon  : 1985   MRN: [id number]     Chief Complaint:  annual physical exam       History of Present Illness: Francine Calderon is a 38 y.o. female who is here today for annual physical exam      She also needs a physical for work.    Labs- 2024  Pap- 3/2024    Last eye exam 3.2024  Last dental exam   Nonsmoker   Occasional etoh  Walking 20 minutes nightly     Subjective      Review of Systems:   Review of Systems   Constitutional:  Negative for fever.   HENT:  Negative for ear pain and sore throat.    Eyes:  Negative for visual disturbance.   Respiratory:  Negative for cough.    Cardiovascular:  Negative for chest pain.   Genitourinary:  Negative for dysuria.   Neurological:  Negative for headaches.        Past Medical History:   Past Medical History:   Diagnosis Date    Abnormal Pap smear of cervix     Anxiety     Depression     Herpes     HPV (human papilloma virus) infection     Scoliosis        Past Surgical History:   Past Surgical History:   Procedure Laterality Date    BACK SURGERY      to repair scoliosis    D & C HYSTEROSCOPY ENDOMETRIAL ABLATION Bilateral 2024    Procedure: DILATATION AND CURETTAGE HYSTEROSCOPY NOVASURE ENDOMETRIAL ABLATION;  Surgeon: Dusty Rivas MD;  Location: [facility];  Service: Gynecology;  Laterality: Bilateral;    SALPINGECTOMY Bilateral 2024    Procedure: SALPINGECTOMY LAPAROSCOPIC;  Surgeon: Dusty Rivas MD;  Location: [facility];  Service: Gynecology;  Laterality: Bilateral;       Family History:   Family History   Problem Relation Age of Onset    Uterine cancer Maternal Grandmother     Uterine cancer Paternal Aunt     Breast cancer Neg Hx     Ovarian cancer Neg Hx     Colon cancer Neg Hx        Social History:   Social History     Socioeconomic History    Marital status: Single   Tobacco Use    Smoking status: Never     Passive exposure: Never    Smokeless tobacco: Never   Vaping Use    Vaping status: Never  Used   Substance and Sexual Activity    Alcohol use: Yes     Comment: rare    Drug use: Never    Sexual activity: Not Currently     Partners: Male     Birth control/protection: Tubal ligation       Medications:     Current Outpatient Medications:     Cetirizine HCl (ZYRTEC ALLERGY PO), Take 10 mg by mouth Daily., Disp: , Rfl:     desvenlafaxine (PRISTIQ) 50 MG 24 hr tablet, TAKE 1 TABLET BY MOUTH DAILY, Disp: 90 tablet, Rfl: 1    Lactobacillus (Acidophilus) 0.5 MG tablet, , Disp: , Rfl:     Multiple Vitamins-Calcium (DAILY VITAMINS FOR WOMEN PO), , Disp: , Rfl:     Allergies:   No Known Allergies          Objective     Physical Exam:  Vital Signs:   Vitals:    06/14/24 0730   BP: 123/85   BP Location: Right arm   Patient Position: Sitting   Pulse: 78   Temp: 96.5 °F (35.8 °C)   SpO2: 98%   Weight: 87.1 kg (192 lb)     Body mass index is 31.95 kg/m².           Physical Exam  HENT:      Right Ear: Tympanic membrane normal.      Left Ear: Tympanic membrane normal.      Nose: Nose normal.      Mouth/Throat:      Mouth: Mucous membranes are moist.   Eyes:      Conjunctiva/sclera: Conjunctivae normal.   Neck:      Vascular: No carotid bruit.   Cardiovascular:      Rate and Rhythm: Normal rate and regular rhythm.      Heart sounds: Normal heart sounds. No murmur heard.  Pulmonary:      Effort: Pulmonary effort is normal.      Breath sounds: Normal breath sounds.   Abdominal:      General: Bowel sounds are normal.      Palpations: Abdomen is soft.   Musculoskeletal:      Right lower leg: No edema.      Left lower leg: No edema.   Skin:     General: Skin is warm and dry.   Neurological:      Mental Status: She is alert.   Psychiatric:         Mood and Affect: Mood normal.         Behavior: Behavior normal.             Assessment / Plan      Assessment/Plan:   Diagnoses and all orders for this visit:    1. Annual physical exam (Primary)    2. Mixed hyperlipidemia  -     Comprehensive Metabolic Panel  -     Lipid Panel      "  Annual physical exam immunizations screening reviewed with patient Pap smear up-to-date discussed screening mammogram started age 40 unless signs or symptoms should occur discussed screening colonoscopy started age 45 again unless signs symptoms occur patient is a non-smoker rare use of EtOH is exercising 20 minutes daily do recommend increasing exercise to 30 minutes daily  Hyperlipidemia will obtain lipid panel to monitor and weight circumference will be obtained for work physical exam requirements        Follow Up:   Return in about 1 year (around 6/14/2025).    Gabriela Paulino, APRN    \"Please note that portions of this note were completed with a voice recognition program.\"    "

## 2024-06-14 ENCOUNTER — OFFICE VISIT (OUTPATIENT)
Dept: FAMILY MEDICINE CLINIC | Facility: CLINIC | Age: 39
End: 2024-06-14
Payer: COMMERCIAL

## 2024-06-14 VITALS
OXYGEN SATURATION: 98 % | SYSTOLIC BLOOD PRESSURE: 123 MMHG | WEIGHT: 192 LBS | TEMPERATURE: 96.5 F | HEART RATE: 78 BPM | BODY MASS INDEX: 31.95 KG/M2 | DIASTOLIC BLOOD PRESSURE: 85 MMHG

## 2024-06-14 DIAGNOSIS — Z00.00 ANNUAL PHYSICAL EXAM: Primary | ICD-10-CM

## 2024-06-14 DIAGNOSIS — E78.2 MIXED HYPERLIPIDEMIA: ICD-10-CM

## 2024-06-14 LAB
ALBUMIN SERPL-MCNC: 4.6 G/DL (ref 3.5–5.2)
ALBUMIN/GLOB SERPL: 1.6 G/DL
ALP SERPL-CCNC: 82 U/L (ref 39–117)
ALT SERPL W P-5'-P-CCNC: 67 U/L (ref 1–33)
ANION GAP SERPL CALCULATED.3IONS-SCNC: 11 MMOL/L (ref 5–15)
AST SERPL-CCNC: 33 U/L (ref 1–32)
BILIRUB SERPL-MCNC: 0.2 MG/DL (ref 0–1.2)
BUN SERPL-MCNC: 11 MG/DL (ref 6–20)
BUN/CREAT SERPL: 14.5 (ref 7–25)
CALCIUM SPEC-SCNC: 10 MG/DL (ref 8.6–10.5)
CHLORIDE SERPL-SCNC: 104 MMOL/L (ref 98–107)
CHOLEST SERPL-MCNC: 198 MG/DL (ref 0–200)
CO2 SERPL-SCNC: 27 MMOL/L (ref 22–29)
CREAT SERPL-MCNC: 0.76 MG/DL (ref 0.57–1)
EGFRCR SERPLBLD CKD-EPI 2021: 103 ML/MIN/1.73
GLOBULIN UR ELPH-MCNC: 2.9 GM/DL
GLUCOSE SERPL-MCNC: 79 MG/DL (ref 65–99)
HDLC SERPL-MCNC: 59 MG/DL (ref 40–60)
LDLC SERPL CALC-MCNC: 123 MG/DL (ref 0–100)
LDLC/HDLC SERPL: 2.06 {RATIO}
POTASSIUM SERPL-SCNC: 4.5 MMOL/L (ref 3.5–5.2)
PROT SERPL-MCNC: 7.5 G/DL (ref 6–8.5)
SODIUM SERPL-SCNC: 142 MMOL/L (ref 136–145)
TRIGL SERPL-MCNC: 87 MG/DL (ref 0–150)
VLDLC SERPL-MCNC: 16 MG/DL (ref 5–40)

## 2024-06-14 PROCEDURE — 80061 LIPID PANEL: CPT | Performed by: NURSE PRACTITIONER

## 2024-06-14 PROCEDURE — 80053 COMPREHEN METABOLIC PANEL: CPT | Performed by: NURSE PRACTITIONER

## 2024-06-14 PROCEDURE — 36415 COLL VENOUS BLD VENIPUNCTURE: CPT | Performed by: NURSE PRACTITIONER

## 2024-06-14 PROCEDURE — 99395 PREV VISIT EST AGE 18-39: CPT | Performed by: NURSE PRACTITIONER

## 2024-06-27 RX ORDER — DESVENLAFAXINE SUCCINATE 50 MG/1
50 TABLET, EXTENDED RELEASE ORAL DAILY
Qty: 90 TABLET | Refills: 1 | Status: SHIPPED | OUTPATIENT
Start: 2024-06-27

## 2024-07-02 DIAGNOSIS — R74.8 ELEVATED LIVER ENZYMES: Primary | ICD-10-CM

## 2024-07-03 ENCOUNTER — PATIENT MESSAGE (OUTPATIENT)
Dept: FAMILY MEDICINE CLINIC | Facility: CLINIC | Age: 39
End: 2024-07-03
Payer: COMMERCIAL

## 2024-07-03 DIAGNOSIS — M79.621 PAIN IN BOTH UPPER ARMS: Primary | ICD-10-CM

## 2024-07-03 DIAGNOSIS — M79.622 PAIN IN BOTH UPPER ARMS: Primary | ICD-10-CM

## 2024-07-03 DIAGNOSIS — R20.0 NUMBNESS AND TINGLING IN BOTH HANDS: ICD-10-CM

## 2024-07-03 DIAGNOSIS — R20.2 NUMBNESS AND TINGLING IN BOTH HANDS: ICD-10-CM

## 2024-07-03 DIAGNOSIS — M79.641 RIGHT HAND PAIN: Primary | ICD-10-CM

## 2024-07-12 ENCOUNTER — LAB (OUTPATIENT)
Dept: LAB | Facility: HOSPITAL | Age: 39
End: 2024-07-12
Payer: COMMERCIAL

## 2024-07-12 DIAGNOSIS — R74.8 ELEVATED LIVER ENZYMES: ICD-10-CM

## 2024-07-12 LAB
ALBUMIN SERPL-MCNC: 4.3 G/DL (ref 3.5–5.2)
ALBUMIN/GLOB SERPL: 1.5 G/DL
ALP SERPL-CCNC: 88 U/L (ref 39–117)
ALT SERPL W P-5'-P-CCNC: 52 U/L (ref 1–33)
ANION GAP SERPL CALCULATED.3IONS-SCNC: 10.2 MMOL/L (ref 5–15)
AST SERPL-CCNC: 28 U/L (ref 1–32)
BILIRUB SERPL-MCNC: <0.2 MG/DL (ref 0–1.2)
BUN SERPL-MCNC: 9 MG/DL (ref 6–20)
BUN/CREAT SERPL: 13.4 (ref 7–25)
CALCIUM SPEC-SCNC: 9.6 MG/DL (ref 8.6–10.5)
CHLORIDE SERPL-SCNC: 104 MMOL/L (ref 98–107)
CO2 SERPL-SCNC: 25.8 MMOL/L (ref 22–29)
CREAT SERPL-MCNC: 0.67 MG/DL (ref 0.57–1)
EGFRCR SERPLBLD CKD-EPI 2021: 114.9 ML/MIN/1.73
GLOBULIN UR ELPH-MCNC: 2.8 GM/DL
GLUCOSE SERPL-MCNC: 96 MG/DL (ref 65–99)
POTASSIUM SERPL-SCNC: 4.4 MMOL/L (ref 3.5–5.2)
PROT SERPL-MCNC: 7.1 G/DL (ref 6–8.5)
SODIUM SERPL-SCNC: 140 MMOL/L (ref 136–145)

## 2024-07-12 PROCEDURE — 36415 COLL VENOUS BLD VENIPUNCTURE: CPT

## 2024-07-12 PROCEDURE — 80053 COMPREHEN METABOLIC PANEL: CPT

## 2024-07-15 DIAGNOSIS — R74.8 ELEVATED LIVER ENZYMES: Primary | ICD-10-CM

## 2024-07-16 ENCOUNTER — OFFICE VISIT (OUTPATIENT)
Dept: FAMILY MEDICINE CLINIC | Facility: CLINIC | Age: 39
End: 2024-07-16
Payer: COMMERCIAL

## 2024-07-16 VITALS
BODY MASS INDEX: 32.62 KG/M2 | HEIGHT: 65 IN | WEIGHT: 195.8 LBS | SYSTOLIC BLOOD PRESSURE: 137 MMHG | HEART RATE: 68 BPM | OXYGEN SATURATION: 99 % | DIASTOLIC BLOOD PRESSURE: 81 MMHG | TEMPERATURE: 98.6 F

## 2024-07-16 DIAGNOSIS — B37.31 VAGINAL YEAST INFECTION: ICD-10-CM

## 2024-07-16 DIAGNOSIS — J01.40 ACUTE NON-RECURRENT PANSINUSITIS: Primary | ICD-10-CM

## 2024-07-16 PROCEDURE — 96372 THER/PROPH/DIAG INJ SC/IM: CPT | Performed by: NURSE PRACTITIONER

## 2024-07-16 PROCEDURE — 99213 OFFICE O/P EST LOW 20 MIN: CPT | Performed by: NURSE PRACTITIONER

## 2024-07-16 RX ORDER — METHYLPREDNISOLONE ACETATE 80 MG/ML
80 INJECTION, SUSPENSION INTRA-ARTICULAR; INTRALESIONAL; INTRAMUSCULAR; SOFT TISSUE ONCE
Status: COMPLETED | OUTPATIENT
Start: 2024-07-16 | End: 2024-07-16

## 2024-07-16 RX ORDER — AMOXICILLIN AND CLAVULANATE POTASSIUM 875; 125 MG/1; MG/1
1 TABLET, FILM COATED ORAL 2 TIMES DAILY
Qty: 20 TABLET | Refills: 0 | Status: SHIPPED | OUTPATIENT
Start: 2024-07-16 | End: 2024-07-26

## 2024-07-16 RX ORDER — FLUCONAZOLE 150 MG/1
TABLET ORAL
Qty: 3 TABLET | Refills: 1 | Status: SHIPPED | OUTPATIENT
Start: 2024-07-16

## 2024-07-16 RX ADMIN — METHYLPREDNISOLONE ACETATE 80 MG: 80 INJECTION, SUSPENSION INTRA-ARTICULAR; INTRALESIONAL; INTRAMUSCULAR; SOFT TISSUE at 14:47

## 2024-07-16 NOTE — LETTER
July 16, 2024     Patient: Francine Calderon   YOB: 1985   Date of Visit: 7/16/2024       To Whom It May Concern:    Francine Calderon was seen in my office today.  She may return to work 7/17/2024.           Sincerely,        Tabatha Zurita, CATHY    CC: No Recipients

## 2024-07-18 ENCOUNTER — OFFICE VISIT (OUTPATIENT)
Dept: ORTHOPEDIC SURGERY | Facility: CLINIC | Age: 39
End: 2024-07-18
Payer: COMMERCIAL

## 2024-07-18 VITALS
BODY MASS INDEX: 32.49 KG/M2 | WEIGHT: 195 LBS | DIASTOLIC BLOOD PRESSURE: 98 MMHG | HEART RATE: 66 BPM | HEIGHT: 65 IN | OXYGEN SATURATION: 98 % | SYSTOLIC BLOOD PRESSURE: 144 MMHG

## 2024-07-18 DIAGNOSIS — M79.641 RIGHT HAND PAIN: Primary | ICD-10-CM

## 2024-07-18 DIAGNOSIS — E06.1 THYROIDITIS, DE QUERVAIN: ICD-10-CM

## 2024-07-18 PROCEDURE — 20600 DRAIN/INJ JOINT/BURSA W/O US: CPT | Performed by: ORTHOPAEDIC SURGERY

## 2024-07-18 PROCEDURE — 99203 OFFICE O/P NEW LOW 30 MIN: CPT | Performed by: ORTHOPAEDIC SURGERY

## 2024-07-18 RX ORDER — DICLOFENAC SODIUM 75 MG/1
75 TABLET, DELAYED RELEASE ORAL 2 TIMES DAILY
Qty: 60 TABLET | Refills: 1 | Status: SHIPPED | OUTPATIENT
Start: 2024-07-18

## 2024-07-18 RX ADMIN — TRIAMCINOLONE ACETONIDE 40 MG: 40 INJECTION, SUSPENSION INTRA-ARTICULAR; INTRAMUSCULAR at 13:30

## 2024-07-18 RX ADMIN — LIDOCAINE HYDROCHLORIDE 1 ML: 10 INJECTION, SOLUTION INFILTRATION; PERINEURAL at 13:30

## 2024-07-18 NOTE — PROGRESS NOTES
"Chief Complaint  Pain and Initial Evaluation of the Right Hand     Subjective      Francine Calderon presents to [facility] for initial evaluation of the right hand.  She has pain in the right wrist for a couple years.  She has had increase pain the last couple of months.  She has tried bracing and notices decrease  and F[facility].      No Known Allergies     Social History     Socioeconomic History    Marital status: Single   Tobacco Use    Smoking status: Never     Passive exposure: Never    Smokeless tobacco: Never   Vaping Use    Vaping status: Never Used   Substance and Sexual Activity    Alcohol use: Yes     Comment: rare    Drug use: Never    Sexual activity: Not Currently     Partners: Male     Birth control/protection: Tubal ligation        I reviewed the patient's chief complaint, history of present illness, review of systems, past medical history, surgical history, family history, social history, medications, and allergy list.     Review of Systems     Constitutional: Denies fevers, chills, weight loss  Cardiovascular: Denies chest pain, shortness of breath  Skin: Denies rashes, acute skin changes  Neurologic: Denies headache, loss of consciousness  MSK: Right hand pain.       Vital Signs:   /98   Pulse 66   Ht 165.1 cm (65\")   Wt 88.5 kg (195 lb)   SpO2 98%   BMI 32.45 kg/m²          Physical Exam  General: Alert. No acute distress    Ortho Exam        RIGHT HAND Negative Compression testing/ Negative Tinels. PositiveFinkelsteins. Negative Cho's testing. Negative CMC grind testing. Negative Phalens. Full ROM of the hand, fingers, elbow and wrist. Negative Triggering of the digit. Sensation grossly intact to light touch, median, radial and ulnar nerve. Positive AIN, PIN and ulnar nerve motor function intact. Axillary nerve intact. Positive pulses.        Small Joint Arthrocentesis  Consent given by: patient  Site marked: site marked  Timeout: Immediately prior to " procedure a time out was called to verify the correct patient, procedure, equipment, support staff and site/side marked as required   Supporting Documentation  Indications: pain   Procedure Details  Location: thumb (dequervans) -   Needle gauge: 23g.  Medications administered: 1 mL lidocaine 1 %; 40 mg triamcinolone acetonide 40 MG/ML  Patient tolerance: patient tolerated the procedure well with no immediate complications      This injection documentation was Scribed for Seamus Luis MD by Lilly Blanca MA.  07/18/24   14:20 EDT      Imaging Results (Most Recent)       Procedure Component Value Units Date/Time    XR Hand 2 View Right [[id number]] Resulted: 07/18/24 1331     Updated: 07/18/24 1336             Result Review :        X-Ray Report:  Right hand  X-Ray  Indication: Evaluation of the right hand.   AP/Lateral view(s)  Findings: No acute fracture or dislocation.   Prior studies available for comparison: no        Assessment and Plan     Diagnoses and all orders for this visit:    1. Right hand pain (Primary)  -     XR Hand 2 View Right  -     diclofenac (VOLTAREN) 75 MG EC tablet; Take 1 tablet by mouth 2 (Two) Times a Day.  Dispense: 60 tablet; Refill: 1    2. Thyroiditis, de Quervain    Other orders  -     Small Joint Arthrocentesis        Discussed the treatment plan with the patient.     Prescribed anti inflammatory.      Discussed the risks and benefits of conservative measures. The patient expressed understanding and wished to proceed with a right deQuervain steroid injection. She tolerated the injection well.       Call or return if worsening symptoms.    Follow Up     PRN      Patient was given instructions and counseling regarding her condition or for health maintenance advice. Please see specific information pulled into the AVS if appropriate.     Transcribed for Seamus Luis MD by Toyin Joseph  07/18/24   14:15 EDT      I have personally performed the services described in this  document as scribed by the above individual and it is both accurate and complete. Seamus Luis MD 07/22/24

## 2024-07-22 RX ORDER — TRIAMCINOLONE ACETONIDE 40 MG/ML
40 INJECTION, SUSPENSION INTRA-ARTICULAR; INTRAMUSCULAR
Status: COMPLETED | OUTPATIENT
Start: 2024-07-18 | End: 2024-07-18

## 2024-07-22 RX ORDER — LIDOCAINE HYDROCHLORIDE 10 MG/ML
1 INJECTION, SOLUTION INFILTRATION; PERINEURAL
Status: COMPLETED | OUTPATIENT
Start: 2024-07-18 | End: 2024-07-18

## 2024-08-15 ENCOUNTER — LAB (OUTPATIENT)
Dept: LAB | Facility: HOSPITAL | Age: 39
End: 2024-08-15
Payer: COMMERCIAL

## 2024-08-15 DIAGNOSIS — R74.8 ELEVATED LIVER ENZYMES: ICD-10-CM

## 2024-08-15 LAB
ALBUMIN SERPL-MCNC: 4.2 G/DL (ref 3.5–5.2)
ALBUMIN/GLOB SERPL: 1.3 G/DL
ALP SERPL-CCNC: 82 U/L (ref 39–117)
ALT SERPL W P-5'-P-CCNC: 28 U/L (ref 1–33)
ANION GAP SERPL CALCULATED.3IONS-SCNC: 7.4 MMOL/L (ref 5–15)
AST SERPL-CCNC: 24 U/L (ref 1–32)
BILIRUB SERPL-MCNC: 0.3 MG/DL (ref 0–1.2)
BUN SERPL-MCNC: 10 MG/DL (ref 6–20)
BUN/CREAT SERPL: 14.9 (ref 7–25)
CALCIUM SPEC-SCNC: 9.3 MG/DL (ref 8.6–10.5)
CHLORIDE SERPL-SCNC: 102 MMOL/L (ref 98–107)
CO2 SERPL-SCNC: 27.6 MMOL/L (ref 22–29)
CREAT SERPL-MCNC: 0.67 MG/DL (ref 0.57–1)
EGFRCR SERPLBLD CKD-EPI 2021: 114.9 ML/MIN/1.73
GLOBULIN UR ELPH-MCNC: 3.2 GM/DL
GLUCOSE SERPL-MCNC: 92 MG/DL (ref 65–99)
POTASSIUM SERPL-SCNC: 4 MMOL/L (ref 3.5–5.2)
PROT SERPL-MCNC: 7.4 G/DL (ref 6–8.5)
SODIUM SERPL-SCNC: 137 MMOL/L (ref 136–145)

## 2024-08-15 PROCEDURE — 80053 COMPREHEN METABOLIC PANEL: CPT

## 2024-08-15 PROCEDURE — 36415 COLL VENOUS BLD VENIPUNCTURE: CPT

## 2024-09-24 RX ORDER — DESVENLAFAXINE 50 MG/1
50 TABLET, FILM COATED, EXTENDED RELEASE ORAL DAILY
Qty: 90 TABLET | Refills: 1 | Status: SHIPPED | OUTPATIENT
Start: 2024-09-24

## 2025-01-12 DIAGNOSIS — M79.641 RIGHT HAND PAIN: ICD-10-CM

## 2025-01-13 RX ORDER — DICLOFENAC SODIUM 75 MG/1
75 TABLET, DELAYED RELEASE ORAL 2 TIMES DAILY
Qty: 60 TABLET | Refills: 1 | Status: SHIPPED | OUTPATIENT
Start: 2025-01-13 | End: 2025-01-14

## 2025-01-14 ENCOUNTER — OFFICE VISIT (OUTPATIENT)
Dept: ORTHOPEDIC SURGERY | Facility: CLINIC | Age: 40
End: 2025-01-14
Payer: COMMERCIAL

## 2025-01-14 VITALS — OXYGEN SATURATION: 98 % | HEART RATE: 73 BPM | DIASTOLIC BLOOD PRESSURE: 97 MMHG | SYSTOLIC BLOOD PRESSURE: 149 MMHG

## 2025-01-14 DIAGNOSIS — M79.641 RIGHT HAND PAIN: Primary | ICD-10-CM

## 2025-01-14 DIAGNOSIS — M65.4 DE QUERVAIN'S DISEASE (TENOSYNOVITIS): ICD-10-CM

## 2025-01-14 RX ORDER — MELOXICAM 15 MG/1
15 TABLET ORAL DAILY
Qty: 30 TABLET | Refills: 0 | Status: SHIPPED | OUTPATIENT
Start: 2025-01-14

## 2025-01-14 RX ORDER — TRIAMCINOLONE ACETONIDE 40 MG/ML
40 INJECTION, SUSPENSION INTRA-ARTICULAR; INTRAMUSCULAR
Status: COMPLETED | OUTPATIENT
Start: 2025-01-14 | End: 2025-01-14

## 2025-01-14 RX ORDER — LIDOCAINE HYDROCHLORIDE 10 MG/ML
1 INJECTION, SOLUTION INFILTRATION; PERINEURAL
Status: COMPLETED | OUTPATIENT
Start: 2025-01-14 | End: 2025-01-14

## 2025-01-14 RX ADMIN — LIDOCAINE HYDROCHLORIDE 1 ML: 10 INJECTION, SOLUTION INFILTRATION; PERINEURAL at 16:00

## 2025-01-14 RX ADMIN — TRIAMCINOLONE ACETONIDE 40 MG: 40 INJECTION, SUSPENSION INTRA-ARTICULAR; INTRAMUSCULAR at 16:00

## 2025-01-14 NOTE — PROGRESS NOTES
Chief Complaint  Follow-up of the Right Hand     Subjective      Francine Calderon presents to [facility] for follow up of the right hand.  She has had injections in the past, anti inflammatory and braces.  The anti inflammatory is not helpful.  She has pain in the right thumb/wrist for a couple years. She has had increase pain the last couple of months. Her last injection was 7/18/24.     No Known Allergies     Social History     Socioeconomic History    Marital status: Single   Tobacco Use    Smoking status: Never     Passive exposure: Never    Smokeless tobacco: Never   Vaping Use    Vaping status: Never Used   Substance and Sexual Activity    Alcohol use: Yes     Comment: rare    Drug use: Never    Sexual activity: Not Currently     Partners: Male     Birth control/protection: Tubal ligation        I reviewed the patient's chief complaint, history of present illness, review of systems, past medical history, surgical history, family history, social history, medications, and allergy list.     Review of Systems     Constitutional: Denies fevers, chills, weight loss  Cardiovascular: Denies chest pain, shortness of breath  Skin: Denies rashes, acute skin changes  Neurologic: Denies headache, loss of consciousness      Vital Signs:   /97   Pulse 73   SpO2 98%          Physical Exam  General: Alert. No acute distress    Ortho Exam        RIGHT WRIST Negative Compression testing/ Negative Tinels. PositiveFinkelsteins. Negative Cho's testing. Negative CMC grind testing. Negative Phalens. Full ROM of the hand, fingers, elbow and wrist. Negative Triggering of the digit. Sensation grossly intact to light touch, median, radial and ulnar nerve. Positive AIN, PIN and ulnar nerve motor function intact. Axillary nerve intact. Positive pulses.        Right De Quervain's Injection  Consent given by: patient  Site marked: site marked  Timeout: Immediately prior to procedure a time out was called  to verify the correct patient, procedure, equipment, support staff and site/side marked as required   Supporting Documentation  Indications: pain   Procedure Details  Location: -   Location: Right De Quervain's Injection.Preparation: Patient was prepped and draped in the usual sterile fashion  Needle gauge: 23G.  Medications administered: 1 mL lidocaine 1 %; 40 mg triamcinolone acetonide 40 MG/ML  Patient tolerance: patient tolerated the procedure well with no immediate complications        This injection documentation was Scribed for Seamus Luis MD by Stacie Guerrero.  01/14/25   16:03 EST      Imaging Results (Most Recent)       None             Result Review :          Assessment and Plan     Diagnoses and all orders for this visit:    1. Right hand pain (Primary)    2. De Quervain's disease (tenosynovitis)  -     Right De Quervain's Injection  -     meloxicam (Mobic) 15 MG tablet; Take 1 tablet by mouth Daily.  Dispense: 30 tablet; Refill: 0        Discussed the treatment plan with the patient.     Discussed the risks and benefits of conservative measures. The patient expressed understanding and wished to proceed with a right dequerveins  steroid injection.  She tolerated the injection well.     Call or return if worsening symptoms.    Follow Up     PRN      Patient was given instructions and counseling regarding her condition or for health maintenance advice. Please see specific information pulled into the AVS if appropriate.     Scribed for Seamus Luis MD by Toyin Joseph MA.  01/14/25   15:57 EST    I have personally performed the services described in this document as scribed by the above individual and it is both accurate and complete. Seamus Luis MD 01/14/25

## 2025-02-12 DIAGNOSIS — M65.4 DE QUERVAIN'S DISEASE (TENOSYNOVITIS): ICD-10-CM

## 2025-02-12 RX ORDER — MELOXICAM 15 MG/1
15 TABLET ORAL DAILY
Qty: 30 TABLET | Refills: 0 | Status: SHIPPED | OUTPATIENT
Start: 2025-02-12

## 2025-03-04 ENCOUNTER — PATIENT ROUNDING (BHMG ONLY) (OUTPATIENT)
Dept: URGENT CARE | Facility: CLINIC | Age: 40
End: 2025-03-04
Payer: COMMERCIAL

## 2025-03-04 NOTE — ED NOTES
Thank you for letting us care for you in your recent visit to our urgent care center. We would love to hear about your experience with us. Was this the first time you have visited our location?    We're always looking for ways to make our patients' experiences even better. Do you have any recommendations on ways we may improve?     I appreciate you taking the time to respond. Please be on the lookout for a survey about your recent visit from [employer] via text or email. We would greatly appreciate if you could fill that out and turn it back in. We want your voice to be heard and we value your feedback.   Thank you for choosing [facility] for your healthcare needs.

## 2025-04-11 ENCOUNTER — TELEPHONE (OUTPATIENT)
Dept: FAMILY MEDICINE CLINIC | Facility: CLINIC | Age: 40
End: 2025-04-11
Payer: COMMERCIAL

## 2025-04-11 NOTE — TELEPHONE ENCOUNTER
Relay  Lvmtcb   Confirming appointment on 4/14/25    Sent patient confirmation request via text through [employer].

## 2025-04-14 ENCOUNTER — OFFICE VISIT (OUTPATIENT)
Dept: FAMILY MEDICINE CLINIC | Facility: CLINIC | Age: 40
End: 2025-04-14
Payer: COMMERCIAL

## 2025-04-14 VITALS
SYSTOLIC BLOOD PRESSURE: 132 MMHG | WEIGHT: 201 LBS | DIASTOLIC BLOOD PRESSURE: 92 MMHG | TEMPERATURE: 97.2 F | OXYGEN SATURATION: 99 % | BODY MASS INDEX: 33.49 KG/M2 | HEART RATE: 79 BPM | HEIGHT: 65 IN

## 2025-04-14 DIAGNOSIS — R53.83 FATIGUE, UNSPECIFIED TYPE: ICD-10-CM

## 2025-04-14 DIAGNOSIS — E78.2 MIXED HYPERLIPIDEMIA: ICD-10-CM

## 2025-04-14 DIAGNOSIS — E55.9 VITAMIN D DEFICIENCY: ICD-10-CM

## 2025-04-14 DIAGNOSIS — R74.8 ELEVATED LIVER ENZYMES: ICD-10-CM

## 2025-04-14 DIAGNOSIS — F33.0 MILD EPISODE OF RECURRENT MAJOR DEPRESSIVE DISORDER: ICD-10-CM

## 2025-04-14 DIAGNOSIS — R07.9 CHEST PAIN, UNSPECIFIED TYPE: ICD-10-CM

## 2025-04-14 DIAGNOSIS — Z13.29 SCREENING FOR THYROID DISORDER: ICD-10-CM

## 2025-04-14 DIAGNOSIS — R00.2 PALPITATIONS: ICD-10-CM

## 2025-04-14 DIAGNOSIS — F41.9 ANXIETY: Primary | ICD-10-CM

## 2025-04-14 DIAGNOSIS — I10 PRIMARY HYPERTENSION: ICD-10-CM

## 2025-04-14 DIAGNOSIS — F41.0 PANIC ATTACK AS REACTION TO STRESS: ICD-10-CM

## 2025-04-14 DIAGNOSIS — F43.0 PANIC ATTACK AS REACTION TO STRESS: ICD-10-CM

## 2025-04-14 PROCEDURE — 99214 OFFICE O/P EST MOD 30 MIN: CPT | Performed by: NURSE PRACTITIONER

## 2025-04-14 PROCEDURE — 93000 ELECTROCARDIOGRAM COMPLETE: CPT | Performed by: NURSE PRACTITIONER

## 2025-04-14 RX ORDER — DICLOFENAC SODIUM 75 MG/1
TABLET, DELAYED RELEASE ORAL
COMMUNITY
Start: 2024-07-22 | End: 2025-04-14

## 2025-04-14 RX ORDER — DESVENLAFAXINE 100 MG/1
100 TABLET, EXTENDED RELEASE ORAL DAILY
Qty: 90 TABLET | Refills: 1 | Status: SHIPPED | OUTPATIENT
Start: 2025-04-14

## 2025-04-14 RX ORDER — LISINOPRIL AND HYDROCHLOROTHIAZIDE 10; 12.5 MG/1; MG/1
1 TABLET ORAL DAILY
Qty: 30 TABLET | Refills: 1 | Status: SHIPPED | OUTPATIENT
Start: 2025-04-14

## 2025-04-14 RX ORDER — LISINOPRIL AND HYDROCHLOROTHIAZIDE 10; 12.5 MG/1; MG/1
1 TABLET ORAL DAILY
Qty: 90 TABLET | OUTPATIENT
Start: 2025-04-14

## 2025-04-14 NOTE — PROGRESS NOTES
Follow Up Office Visit      Patient Name: Francine Calderon  : 1985   MRN: [id number]     Chief Complaint:    Chief Complaint   Patient presents with   • Hypertension   • Stress   • Anxiety   • Foot Swelling   • Headache       History of Present Illness: Francine Calderon is a 39 y.o. female who is here today to follow up for Depression, allergies,anemia,hyperlipidemia       Follow up high blood pressure readings in the past  7 months, progressively worse  Under a large amount of stress  Working 60 hours per week  Best friend dx with cancer she is helping taking care of her    Intermittent chest pain with palpitations     Pt c/o feeling tired all the time, No trouble sleep wanting to sleep all the time   Wants to crawl under her desk at work and cry       Also c/o swelling in her feet with high blood pressure readings     Pap- 3/2024     Last eye exam 3.2024  Last dental exam   Nonsmoker   Occasional etoh  Walking 20 minutes nightly     Subjective      Review of Systems:   Review of Systems   Constitutional:  Positive for fatigue. Negative for fever.   Respiratory:  Negative for cough and shortness of breath.    Cardiovascular:  Positive for chest pain and palpitations.   Gastrointestinal:  Negative for nausea.   Neurological:  Positive for headaches. Negative for dizziness.   Psychiatric/Behavioral:  Negative for confusion. The patient is nervous/anxious.         Past Medical History:   Past Medical History:   Diagnosis Date   • Abnormal Pap smear of cervix    • Anxiety    • Depression    • Herpes    • HPV (human papilloma virus) infection    • Scoliosis        Past Surgical History:   Past Surgical History:   Procedure Laterality Date   • BACK SURGERY      to repair scoliosis   • D & C HYSTEROSCOPY ENDOMETRIAL ABLATION Bilateral 2024    Procedure: DILATATION AND CURETTAGE HYSTEROSCOPY NOVASURE ENDOMETRIAL ABLATION;  Surgeon: Dusty Rivas MD;  Location: [facility];  Service: Gynecology;   "Laterality: Bilateral;   • SALPINGECTOMY Bilateral 5/6/2024    Procedure: SALPINGECTOMY LAPAROSCOPIC;  Surgeon: Dusty Rivas MD;  Location: [facility];  Service: Gynecology;  Laterality: Bilateral;       Family History:   Family History   Problem Relation Age of Onset   • Uterine cancer Maternal Grandmother    • Uterine cancer Paternal Aunt    • Breast cancer Neg Hx    • Ovarian cancer Neg Hx    • Colon cancer Neg Hx        Social History:   Social History     Socioeconomic History   • Marital status: Single   Tobacco Use   • Smoking status: Never     Passive exposure: Never   • Smokeless tobacco: Never   Vaping Use   • Vaping status: Never Used   Substance and Sexual Activity   • Alcohol use: Yes     Comment: rare   • Drug use: Never   • Sexual activity: Not Currently     Partners: Male     Birth control/protection: Tubal ligation       Medications:     Current Outpatient Medications:   •  Cetirizine HCl (ZYRTEC ALLERGY PO), Take 10 mg by mouth Daily., Disp: , Rfl:   •  desvenlafaxine (PRISTIQ) 100 MG 24 hr tablet, Take 1 tablet by mouth Daily., Disp: 90 tablet, Rfl: 1  •  Multiple Vitamins-Calcium (DAILY VITAMINS FOR WOMEN PO), , Disp: , Rfl:   •  lisinopril-hydrochlorothiazide (PRINZIDE,ZESTORETIC) 10-12.5 MG per tablet, Take 1 tablet by mouth Daily., Disp: 30 tablet, Rfl: 1    Allergies:   No Known Allergies        Objective     Physical Exam:  Vital Signs:   Vitals:    04/14/25 0920   BP: 132/92   BP Location: Left arm   Patient Position: Sitting   Cuff Size: Adult   Pulse: 79   Temp: 97.2 °F (36.2 °C)   SpO2: 99%   Weight: 91.2 kg (201 lb)   Height: 165.1 cm (65\")     Body mass index is 33.45 kg/m².   BMI is >= 30 and <35. (Class 1 Obesity). The following options were offered after discussion;: exercise counseling/recommendations and nutrition counseling/recommendations       Physical Exam  Eyes:      Conjunctiva/sclera: Conjunctivae normal.   Cardiovascular:      Rate and Rhythm: Normal rate and " regular rhythm.      Heart sounds: Normal heart sounds. No murmur heard.  Pulmonary:      Effort: Pulmonary effort is normal.      Breath sounds: Normal breath sounds.   Abdominal:      General: Bowel sounds are normal.      Palpations: Abdomen is soft.   Musculoskeletal:      Right lower leg: No edema.      Left lower leg: No edema.   Skin:     General: Skin is warm and dry.   Neurological:      Mental Status: She is alert.   Psychiatric:         Mood and Affect: Mood normal.         Behavior: Behavior normal.     ECG 12 Lead    Date/Time: 4/14/2025 10:06 AM  Performed by: Bre Paulino APRN    Authorized by: Bre Paulino APRN  Comparison: compared with previous ECG from 7/1/2023  Similar to previous ECG  Comparison to previous ECG: HEART RATE= 67  bpm  RR Interval= 892  ms  MA Interval= 163  ms  P Horizontal Axis= 5  deg  P Front Axis= 55  deg  QRSD Interval= 95  ms  QT Interval= 389  ms  QRS Axis= -29  deg  T Wave Axis= 46  deg  - BORDERLINE ECG -  Sinus rhythm  Borderline left axis deviation  Low voltage, precordial leads  Consider anterior infarct        Rhythm: sinus rhythm  Comments: Sinus rhythm  Left axis deviation  Low voltage precordial leads             Assessment / Plan      Assessment/Plan:   Diagnoses and all orders for this visit:    1. Anxiety (Primary)    2. Mild episode of recurrent major depressive disorder    3. Panic attack as reaction to stress    4. Primary hypertension  -     CBC Auto Differential  -     Comprehensive Metabolic Panel    5. Palpitations  -     Holter Monitor - 48 Hour; Future    6. Chest pain, unspecified type    7. Mixed hyperlipidemia  -     Lipid Panel    8. Elevated liver enzymes    9. Screening for thyroid disorder  -     TSH  -     T4, Free    10. Fatigue, unspecified type  -     Iron Profile  -     Ferritin  -     Vitamin B12    11. Vitamin D deficiency  -     Vitamin D,25-Hydroxy    Other orders  -     lisinopril-hydrochlorothiazide  "(PRINZIDE,ZESTORETIC) 10-12.5 MG per tablet; Take 1 tablet by mouth Daily.  Dispense: 30 tablet; Refill: 1  -     desvenlafaxine (PRISTIQ) 100 MG 24 hr tablet; Take 1 tablet by mouth Daily.  Dispense: 90 tablet; Refill: 1    Hypertension will start lisinopril-hydrochlorothiazide recommend increase water intake reduce salt intake will follow-up in 4 weeks  Anxiety depression panic attack related to stress will increase Pristiq to 100 mg daily patient reports she previously tried BuSpar felt that caused palpitations  Palpitations EKG in office sinus rhythm will obtain Holter monitor  Chest pain EKG in office no acute findings  Elevated liver enzymes will obtain CMP to monitor  Will will screen for thyroid disorder  Fatigue will obtain labs  Vitamin D deficiency will obtain labs to monitor      Follow Up:   Return in about 4 weeks (around 5/12/2025).    CATHY Mohr    \"Please note that portions of this note were completed with a voice recognition program.\"    "

## 2025-04-15 ENCOUNTER — LAB (OUTPATIENT)
Dept: LAB | Facility: HOSPITAL | Age: 40
End: 2025-04-15
Payer: COMMERCIAL

## 2025-04-15 LAB
25(OH)D3 SERPL-MCNC: 36.9 NG/ML (ref 30–100)
ALBUMIN SERPL-MCNC: 4.2 G/DL (ref 3.5–5.2)
ALBUMIN/GLOB SERPL: 1.3 G/DL
ALP SERPL-CCNC: 76 U/L (ref 39–117)
ALT SERPL W P-5'-P-CCNC: 23 U/L (ref 1–33)
ANION GAP SERPL CALCULATED.3IONS-SCNC: 12.1 MMOL/L (ref 5–15)
AST SERPL-CCNC: 31 U/L (ref 1–32)
BASOPHILS # BLD AUTO: 0.05 10*3/MM3 (ref 0–0.2)
BASOPHILS NFR BLD AUTO: 0.6 % (ref 0–1.5)
BILIRUB SERPL-MCNC: 0.4 MG/DL (ref 0–1.2)
BUN SERPL-MCNC: 7 MG/DL (ref 6–20)
BUN/CREAT SERPL: 10.9 (ref 7–25)
CALCIUM SPEC-SCNC: 9.4 MG/DL (ref 8.6–10.5)
CHLORIDE SERPL-SCNC: 101 MMOL/L (ref 98–107)
CHOLEST SERPL-MCNC: 236 MG/DL (ref 0–200)
CO2 SERPL-SCNC: 25.9 MMOL/L (ref 22–29)
CREAT SERPL-MCNC: 0.64 MG/DL (ref 0.57–1)
DEPRECATED RDW RBC AUTO: 43.5 FL (ref 37–54)
EGFRCR SERPLBLD CKD-EPI 2021: 115.5 ML/MIN/1.73
EOSINOPHIL # BLD AUTO: 0.06 10*3/MM3 (ref 0–0.4)
EOSINOPHIL NFR BLD AUTO: 0.8 % (ref 0.3–6.2)
ERYTHROCYTE [DISTWIDTH] IN BLOOD BY AUTOMATED COUNT: 12.4 % (ref 12.3–15.4)
FERRITIN SERPL-MCNC: 166 NG/ML (ref 13–150)
GLOBULIN UR ELPH-MCNC: 3.2 GM/DL
GLUCOSE SERPL-MCNC: 87 MG/DL (ref 65–99)
HCT VFR BLD AUTO: 39.1 % (ref 34–46.6)
HDLC SERPL-MCNC: 58 MG/DL (ref 40–60)
HGB BLD-MCNC: 13.1 G/DL (ref 12–15.9)
IMM GRANULOCYTES # BLD AUTO: 0.03 10*3/MM3 (ref 0–0.05)
IMM GRANULOCYTES NFR BLD AUTO: 0.4 % (ref 0–0.5)
IRON 24H UR-MRATE: 149 MCG/DL (ref 37–145)
IRON SATN MFR SERPL: 32 % (ref 20–50)
LDLC SERPL CALC-MCNC: 155 MG/DL (ref 0–100)
LDLC/HDLC SERPL: 2.62 {RATIO}
LYMPHOCYTES # BLD AUTO: 2.39 10*3/MM3 (ref 0.7–3.1)
LYMPHOCYTES NFR BLD AUTO: 31 % (ref 19.6–45.3)
MCH RBC QN AUTO: 32.2 PG (ref 26.6–33)
MCHC RBC AUTO-ENTMCNC: 33.5 G/DL (ref 31.5–35.7)
MCV RBC AUTO: 96.1 FL (ref 79–97)
MONOCYTES # BLD AUTO: 0.64 10*3/MM3 (ref 0.1–0.9)
MONOCYTES NFR BLD AUTO: 8.3 % (ref 5–12)
NEUTROPHILS NFR BLD AUTO: 4.54 10*3/MM3 (ref 1.7–7)
NEUTROPHILS NFR BLD AUTO: 58.9 % (ref 42.7–76)
NRBC BLD AUTO-RTO: 0 /100 WBC (ref 0–0.2)
PLATELET # BLD AUTO: 391 10*3/MM3 (ref 140–450)
PMV BLD AUTO: 10 FL (ref 6–12)
POTASSIUM SERPL-SCNC: 4.2 MMOL/L (ref 3.5–5.2)
PROT SERPL-MCNC: 7.4 G/DL (ref 6–8.5)
RBC # BLD AUTO: 4.07 10*6/MM3 (ref 3.77–5.28)
SODIUM SERPL-SCNC: 139 MMOL/L (ref 136–145)
T4 FREE SERPL-MCNC: 1.08 NG/DL (ref 0.92–1.68)
TIBC SERPL-MCNC: 463 MCG/DL (ref 298–536)
TRANSFERRIN SERPL-MCNC: 311 MG/DL (ref 200–360)
TRIGL SERPL-MCNC: 130 MG/DL (ref 0–150)
TSH SERPL DL<=0.05 MIU/L-ACNC: 3.01 UIU/ML (ref 0.27–4.2)
VIT B12 BLD-MCNC: 404 PG/ML (ref 211–946)
VLDLC SERPL-MCNC: 23 MG/DL (ref 5–40)
WBC NRBC COR # BLD AUTO: 7.71 10*3/MM3 (ref 3.4–10.8)

## 2025-04-15 PROCEDURE — 85025 COMPLETE CBC W/AUTO DIFF WBC: CPT | Performed by: NURSE PRACTITIONER

## 2025-04-15 PROCEDURE — 84439 ASSAY OF FREE THYROXINE: CPT | Performed by: NURSE PRACTITIONER

## 2025-04-15 PROCEDURE — 80053 COMPREHEN METABOLIC PANEL: CPT | Performed by: NURSE PRACTITIONER

## 2025-04-15 PROCEDURE — 82728 ASSAY OF FERRITIN: CPT | Performed by: NURSE PRACTITIONER

## 2025-04-15 PROCEDURE — 82306 VITAMIN D 25 HYDROXY: CPT | Performed by: NURSE PRACTITIONER

## 2025-04-15 PROCEDURE — 83540 ASSAY OF IRON: CPT | Performed by: NURSE PRACTITIONER

## 2025-04-15 PROCEDURE — 82607 VITAMIN B-12: CPT | Performed by: NURSE PRACTITIONER

## 2025-04-15 PROCEDURE — 84466 ASSAY OF TRANSFERRIN: CPT | Performed by: NURSE PRACTITIONER

## 2025-04-15 PROCEDURE — 84443 ASSAY THYROID STIM HORMONE: CPT | Performed by: NURSE PRACTITIONER

## 2025-04-15 PROCEDURE — 80061 LIPID PANEL: CPT | Performed by: NURSE PRACTITIONER

## 2025-05-09 NOTE — PROGRESS NOTES
Follow Up Office Visit      Patient Name: Francine Calderon  : 1985   MRN: [id number]     Chief Complaint:    Chief Complaint   Patient presents with    Depression    Anemia    Hyperlipidemia    Anxiety       History of Present Illness: Francine Calderon is a 39 y.o. female who is here today to follow up for anxiety, Depression, allergies,anemia,hyperlipidemia    Review lab results     Follow up new  start lisinopril-hydrochlorothiazide recommend increase water intake reduce salt intake will follow-up in 4 weeks  Anxiety depression panic attack related to stress will increase Pristiq to 100 mg daily     Palpitations resolved since starting lisinopril/hctz   Loss of 6 lbs in the past 4 weeks   Eating better      Patient states she went back to the 50mg of Pristiq  the 100mg she had no feeling at all and felt werid, feels amazing today, taken some things off her plate at work, which has also helped   Exercising     Pt c/o right upper quadrant pain x2 weeks, one night last week up all night vomiting green/gray   After eating chicken mashed potatoes with sour cream   Also reflux is back     Labs-2025   Last eye exam 3.2025  Last dental exam 3/2025  Nonsmoker   Occasional etoh  Walking 20 minutes nightly   Pap- 3/2024    Subjective      Review of Systems:   Review of Systems   Constitutional:  Negative for fever.   Respiratory:  Negative for cough and shortness of breath.    Cardiovascular:  Negative for chest pain and palpitations.   Musculoskeletal:  Negative for myalgias.   Neurological:  Negative for dizziness and headaches.   Psychiatric/Behavioral:  The patient is not nervous/anxious.         Past Medical History:   Past Medical History:   Diagnosis Date    Abnormal Pap smear of cervix     Anxiety     Depression     Herpes     HPV (human papilloma virus) infection     Scoliosis        Past Surgical History:   Past Surgical History:   Procedure Laterality Date    BACK SURGERY      to repair scoliosis    D &  "C HYSTEROSCOPY ENDOMETRIAL ABLATION Bilateral 5/6/2024    Procedure: DILATATION AND CURETTAGE HYSTEROSCOPY NOVASURE ENDOMETRIAL ABLATION;  Surgeon: Dusty Rivas MD;  Location: [facility] MAIN OR;  Service: Gynecology;  Laterality: Bilateral;    SALPINGECTOMY Bilateral 5/6/2024    Procedure: SALPINGECTOMY LAPAROSCOPIC;  Surgeon: Dusty Rivas MD;  Location: [facility] MAIN OR;  Service: Gynecology;  Laterality: Bilateral;       Family History:   Family History   Problem Relation Age of Onset    Uterine cancer Maternal Grandmother     Uterine cancer Paternal Aunt     Breast cancer Neg Hx     Ovarian cancer Neg Hx     Colon cancer Neg Hx        Social History:   Social History     Socioeconomic History    Marital status: Single   Tobacco Use    Smoking status: Never     Passive exposure: Never    Smokeless tobacco: Never   Vaping Use    Vaping status: Never Used   Substance and Sexual Activity    Alcohol use: Yes     Comment: rare    Drug use: Never    Sexual activity: Not Currently     Partners: Male     Birth control/protection: Tubal ligation       Medications:     Current Outpatient Medications:     Cetirizine HCl (ZYRTEC ALLERGY PO), Take 10 mg by mouth Daily., Disp: , Rfl:     desvenlafaxine (PRISTIQ) 100 MG 24 hr tablet, Take 1 tablet by mouth Daily., Disp: 90 tablet, Rfl: 1    Multiple Vitamins-Calcium (DAILY VITAMINS FOR WOMEN PO), , Disp: , Rfl:     lisinopril-hydrochlorothiazide (PRINZIDE,ZESTORETIC) 20-12.5 MG per tablet, Take 1 tablet by mouth Daily., Disp: 90 tablet, Rfl: 1    omeprazole (priLOSEC) 40 MG capsule, Take 1 capsule by mouth Daily., Disp: 90 capsule, Rfl: 1    Allergies:   No Known Allergies        Objective     Physical Exam:  Vital Signs:   Vitals:    05/14/25 0957 05/14/25 1047   BP: 146/89 140/82   Pulse: 82    Temp: 97.9 °F (36.6 °C)    SpO2: 97%    Weight: 88.5 kg (195 lb 3.2 oz)    Height: 165.1 cm (65\")    PainSc: 0-No pain      Body mass index is 32.48 kg/m².           Physical " Exam  Eyes:      Conjunctiva/sclera: Conjunctivae normal.   Neck:      Vascular: No carotid bruit.   Cardiovascular:      Rate and Rhythm: Normal rate and regular rhythm.      Heart sounds: Normal heart sounds. No murmur heard.  Pulmonary:      Effort: Pulmonary effort is normal.      Breath sounds: Normal breath sounds.   Abdominal:      General: Bowel sounds are normal.      Palpations: Abdomen is soft.   Musculoskeletal:      Right lower leg: No edema.      Left lower leg: No edema.   Skin:     General: Skin is warm and dry.   Neurological:      Mental Status: She is alert.   Psychiatric:         Mood and Affect: Mood normal.         Behavior: Behavior normal.             Assessment / Plan      Assessment/Plan:   Diagnoses and all orders for this visit:    1. Primary hypertension (Primary)  -     CBC Auto Differential    2. Anxiety    3. Mild episode of recurrent major depressive disorder    4. Palpitations    5. Mixed hyperlipidemia    6. Right upper quadrant abdominal pain  -     Amylase  -     Lipase  -     Comprehensive Metabolic Panel  -     US Gallbladder; Future    7. Gastroesophageal reflux disease, unspecified whether esophagitis present  -     H. Pylori Antibody, IgG ([facility] and [facility] only); Future  -     H. Pylori Antibody, IgG ([facility] and [facility] only)    Other orders  -     omeprazole (priLOSEC) 40 MG capsule; Take 1 capsule by mouth Daily.  Dispense: 90 capsule; Refill: 1  -     lisinopril-hydrochlorothiazide (PRINZIDE,ZESTORETIC) 20-12.5 MG per tablet; Take 1 tablet by mouth Daily.  Dispense: 90 tablet; Refill: 1         Hypertension improving still remains elevated will increase lisinopril to 20 mg keep hydrochlorothiazide 12.5 blood pressure check in 4 weeks  Anxiety depression stable on Pristiq 50 mg once daily at this time  Palpitations have resolved  Hyperlipidemia recommend cutting down fried foods red meat pork products cheese increasing fruits vegetables whole grains  Right upper quadrant pain will  "obtain gallbladder ultrasound labs  Reflux obtain H. pylori start omeprazole call with all results and further recommendations do recommend avoid fried foods fatty foods red products citric acid chocolate caffeine    Follow Up:   Return in about 6 months (around 11/14/2025).    Gabriela Paulino, CATHY    \"Please note that portions of this note were completed with a voice recognition program.\"     "

## 2025-05-14 ENCOUNTER — RESULTS FOLLOW-UP (OUTPATIENT)
Dept: FAMILY MEDICINE CLINIC | Facility: CLINIC | Age: 40
End: 2025-05-14

## 2025-05-14 ENCOUNTER — OFFICE VISIT (OUTPATIENT)
Dept: FAMILY MEDICINE CLINIC | Facility: CLINIC | Age: 40
End: 2025-05-14
Payer: COMMERCIAL

## 2025-05-14 VITALS
HEART RATE: 82 BPM | HEIGHT: 65 IN | DIASTOLIC BLOOD PRESSURE: 82 MMHG | TEMPERATURE: 97.9 F | BODY MASS INDEX: 32.52 KG/M2 | OXYGEN SATURATION: 97 % | WEIGHT: 195.2 LBS | SYSTOLIC BLOOD PRESSURE: 140 MMHG

## 2025-05-14 DIAGNOSIS — R10.11 RIGHT UPPER QUADRANT ABDOMINAL PAIN: ICD-10-CM

## 2025-05-14 DIAGNOSIS — E78.2 MIXED HYPERLIPIDEMIA: ICD-10-CM

## 2025-05-14 DIAGNOSIS — F41.9 ANXIETY: ICD-10-CM

## 2025-05-14 DIAGNOSIS — I10 PRIMARY HYPERTENSION: Primary | ICD-10-CM

## 2025-05-14 DIAGNOSIS — K21.9 GASTROESOPHAGEAL REFLUX DISEASE, UNSPECIFIED WHETHER ESOPHAGITIS PRESENT: ICD-10-CM

## 2025-05-14 DIAGNOSIS — R00.2 PALPITATIONS: ICD-10-CM

## 2025-05-14 DIAGNOSIS — K76.0 FATTY LIVER: ICD-10-CM

## 2025-05-14 DIAGNOSIS — R16.0 ENLARGED LIVER: Primary | ICD-10-CM

## 2025-05-14 DIAGNOSIS — F33.0 MILD EPISODE OF RECURRENT MAJOR DEPRESSIVE DISORDER: ICD-10-CM

## 2025-05-14 PROBLEM — R03.0 ELEVATED BLOOD PRESSURE READING: Status: RESOLVED | Noted: 2023-07-18 | Resolved: 2025-05-14

## 2025-05-14 LAB
ALBUMIN SERPL-MCNC: 4.4 G/DL (ref 3.5–5.2)
ALBUMIN/GLOB SERPL: 1.3 G/DL
ALP SERPL-CCNC: 88 U/L (ref 39–117)
ALT SERPL W P-5'-P-CCNC: 17 U/L (ref 1–33)
AMYLASE SERPL-CCNC: 43 U/L (ref 28–100)
ANION GAP SERPL CALCULATED.3IONS-SCNC: 10.8 MMOL/L (ref 5–15)
AST SERPL-CCNC: 16 U/L (ref 1–32)
BASOPHILS # BLD AUTO: 0.06 10*3/MM3 (ref 0–0.2)
BASOPHILS NFR BLD AUTO: 0.9 % (ref 0–1.5)
BILIRUB SERPL-MCNC: <0.2 MG/DL (ref 0–1.2)
BUN SERPL-MCNC: 9 MG/DL (ref 6–20)
BUN/CREAT SERPL: 13.2 (ref 7–25)
CALCIUM SPEC-SCNC: 10.1 MG/DL (ref 8.6–10.5)
CHLORIDE SERPL-SCNC: 100 MMOL/L (ref 98–107)
CO2 SERPL-SCNC: 27.2 MMOL/L (ref 22–29)
CREAT SERPL-MCNC: 0.68 MG/DL (ref 0.57–1)
DEPRECATED RDW RBC AUTO: 40.6 FL (ref 37–54)
EGFRCR SERPLBLD CKD-EPI 2021: 113.8 ML/MIN/1.73
EOSINOPHIL # BLD AUTO: 0.07 10*3/MM3 (ref 0–0.4)
EOSINOPHIL NFR BLD AUTO: 1 % (ref 0.3–6.2)
ERYTHROCYTE [DISTWIDTH] IN BLOOD BY AUTOMATED COUNT: 11.7 % (ref 12.3–15.4)
GLOBULIN UR ELPH-MCNC: 3.3 GM/DL
GLUCOSE SERPL-MCNC: 92 MG/DL (ref 65–99)
H PYLORI IGG SER IA-ACNC: NEGATIVE
HCT VFR BLD AUTO: 39.2 % (ref 34–46.6)
HGB BLD-MCNC: 13 G/DL (ref 12–15.9)
IMM GRANULOCYTES # BLD AUTO: 0.01 10*3/MM3 (ref 0–0.05)
IMM GRANULOCYTES NFR BLD AUTO: 0.1 % (ref 0–0.5)
LIPASE SERPL-CCNC: 36 U/L (ref 13–60)
LYMPHOCYTES # BLD AUTO: 2.42 10*3/MM3 (ref 0.7–3.1)
LYMPHOCYTES NFR BLD AUTO: 34.8 % (ref 19.6–45.3)
MCH RBC QN AUTO: 31.8 PG (ref 26.6–33)
MCHC RBC AUTO-ENTMCNC: 33.2 G/DL (ref 31.5–35.7)
MCV RBC AUTO: 95.8 FL (ref 79–97)
MONOCYTES # BLD AUTO: 0.56 10*3/MM3 (ref 0.1–0.9)
MONOCYTES NFR BLD AUTO: 8 % (ref 5–12)
NEUTROPHILS NFR BLD AUTO: 3.84 10*3/MM3 (ref 1.7–7)
NEUTROPHILS NFR BLD AUTO: 55.2 % (ref 42.7–76)
NRBC BLD AUTO-RTO: 0 /100 WBC (ref 0–0.2)
PLATELET # BLD AUTO: 379 10*3/MM3 (ref 140–450)
PMV BLD AUTO: 10.1 FL (ref 6–12)
POTASSIUM SERPL-SCNC: 3.7 MMOL/L (ref 3.5–5.2)
PROT SERPL-MCNC: 7.7 G/DL (ref 6–8.5)
RBC # BLD AUTO: 4.09 10*6/MM3 (ref 3.77–5.28)
SODIUM SERPL-SCNC: 138 MMOL/L (ref 136–145)
WBC NRBC COR # BLD AUTO: 6.96 10*3/MM3 (ref 3.4–10.8)

## 2025-05-14 PROCEDURE — 80053 COMPREHEN METABOLIC PANEL: CPT | Performed by: NURSE PRACTITIONER

## 2025-05-14 PROCEDURE — 83690 ASSAY OF LIPASE: CPT | Performed by: NURSE PRACTITIONER

## 2025-05-14 PROCEDURE — 82150 ASSAY OF AMYLASE: CPT | Performed by: NURSE PRACTITIONER

## 2025-05-14 PROCEDURE — 85025 COMPLETE CBC W/AUTO DIFF WBC: CPT | Performed by: NURSE PRACTITIONER

## 2025-05-14 PROCEDURE — 86677 HELICOBACTER PYLORI ANTIBODY: CPT | Performed by: NURSE PRACTITIONER

## 2025-05-14 RX ORDER — LISINOPRIL AND HYDROCHLOROTHIAZIDE 12.5; 2 MG/1; MG/1
1 TABLET ORAL DAILY
Qty: 90 TABLET | Refills: 1 | Status: SHIPPED | OUTPATIENT
Start: 2025-05-14

## 2025-05-14 RX ORDER — OMEPRAZOLE 40 MG/1
40 CAPSULE, DELAYED RELEASE ORAL DAILY
Qty: 90 CAPSULE | Refills: 1 | Status: SHIPPED | OUTPATIENT
Start: 2025-05-14

## 2025-05-23 ENCOUNTER — HOSPITAL ENCOUNTER (OUTPATIENT)
Dept: ULTRASOUND IMAGING | Facility: HOSPITAL | Age: 40
Discharge: HOME | End: 2025-05-23
Admitting: NURSE PRACTITIONER
Payer: COMMERCIAL

## 2025-05-23 DIAGNOSIS — R10.11 RIGHT UPPER QUADRANT ABDOMINAL PAIN: ICD-10-CM

## 2025-05-23 PROCEDURE — 76705 ECHO EXAM OF ABDOMEN: CPT

## 2025-06-17 ENCOUNTER — PATIENT MESSAGE (OUTPATIENT)
Dept: FAMILY MEDICINE CLINIC | Facility: CLINIC | Age: 40
End: 2025-06-17
Payer: COMMERCIAL

## 2025-06-17 RX ORDER — DESVENLAFAXINE 50 MG/1
50 TABLET, FILM COATED, EXTENDED RELEASE ORAL DAILY
Qty: 90 TABLET | Refills: 1 | Status: SHIPPED | OUTPATIENT
Start: 2025-06-17

## 2025-07-08 ENCOUNTER — OFFICE VISIT (OUTPATIENT)
Dept: FAMILY MEDICINE CLINIC | Facility: CLINIC | Age: 40
End: 2025-07-08
Payer: COMMERCIAL

## 2025-07-08 VITALS
OXYGEN SATURATION: 99 % | SYSTOLIC BLOOD PRESSURE: 123 MMHG | HEIGHT: 65 IN | DIASTOLIC BLOOD PRESSURE: 81 MMHG | HEART RATE: 71 BPM | BODY MASS INDEX: 31.82 KG/M2 | WEIGHT: 191 LBS | TEMPERATURE: 98.6 F

## 2025-07-08 DIAGNOSIS — B37.49 CANDIDIASIS OF GENITALIA: ICD-10-CM

## 2025-07-08 DIAGNOSIS — Z20.2 POSSIBLE EXPOSURE TO STD: ICD-10-CM

## 2025-07-08 DIAGNOSIS — Z20.2 POSSIBLE EXPOSURE TO STD: Primary | ICD-10-CM

## 2025-07-08 LAB
BACTERIAL VAGINOSIS VAG-IMP: NEGATIVE
C TRACH DNA SPEC QL NAA+PROBE: NOT DETECTED
CANDIDA DNA VAG QL NAA+PROBE: NOT DETECTED
CANDIDA DNA VAG QL NAA+PROBE: NOT DETECTED
N GONORRHOEA RRNA SPEC QL NAA+PROBE: NOT DETECTED
T VAGINALIS DNA VAG QL NAA+PROBE: NOT DETECTED

## 2025-07-08 PROCEDURE — 87491 CHLMYD TRACH DNA AMP PROBE: CPT | Performed by: NURSE PRACTITIONER

## 2025-07-08 PROCEDURE — 99213 OFFICE O/P EST LOW 20 MIN: CPT | Performed by: NURSE PRACTITIONER

## 2025-07-08 PROCEDURE — 81515 NFCT DS BV&VAGINITIS DNA ALG: CPT | Performed by: NURSE PRACTITIONER

## 2025-07-08 PROCEDURE — 87591 N.GONORRHOEAE DNA AMP PROB: CPT | Performed by: NURSE PRACTITIONER

## 2025-07-08 RX ORDER — NYSTATIN AND TRIAMCINOLONE ACETONIDE 100000; 1 [USP'U]/G; MG/G
1 OINTMENT TOPICAL 2 TIMES DAILY
Qty: 60 G | Refills: 1 | Status: SHIPPED | OUTPATIENT
Start: 2025-07-08

## 2025-07-08 NOTE — PROGRESS NOTES
ACUTE VISIT     Patient Name: rFancine Calderon  : 1985   MRN: [id number]     Chief Complaint:  rash       History of Present Illness: Francine Calderon is a 39 y.o. female who is here today for rash vaginal area    Wants STD testing, she has been sexually active   History of genital herpes     PAP   LMP 25  Subjective      Review of Systems:   Review of Systems   Constitutional:  Negative for fever.   Respiratory:  Negative for cough.    Cardiovascular:  Negative for chest pain.   Gastrointestinal:  Negative for abdominal pain.   Genitourinary:  Negative for dysuria.   Skin:  Positive for rash.        Past Medical History:   Past Medical History:   Diagnosis Date    Abnormal Pap smear of cervix     Allergic     Anxiety     Depression     Headache     Have day before period. Also at random times that will last for days to aweek    Herpes     HPV (human papilloma virus) infection     Hypertension     Irritable bowel syndrome     Scoliosis        Past Surgical History:   Past Surgical History:   Procedure Laterality Date    BACK SURGERY      to repair scoliosis    D & C HYSTEROSCOPY ENDOMETRIAL ABLATION Bilateral 2024    Procedure: DILATATION AND CURETTAGE HYSTEROSCOPY NOVASURE ENDOMETRIAL ABLATION;  Surgeon: Dusty Rivas MD;  Location: [facility];  Service: Gynecology;  Laterality: Bilateral;    ENDOMETRIAL ABLATION  24    With a salpingectomy    SALPINGECTOMY Bilateral 2024    Procedure: SALPINGECTOMY LAPAROSCOPIC;  Surgeon: Dusty Rivas MD;  Location: [facility];  Service: Gynecology;  Laterality: Bilateral;    SPINE SURGERY  rods to correct scolosis       Family History:   Family History   Problem Relation Age of Onset    Uterine cancer Maternal Grandmother     Cancer Maternal Grandmother     Depression Maternal Grandmother     Miscarriages / Stillbirths Maternal Grandmother     Uterine cancer Paternal Aunt     Depression Mother         Recently on medication     "Mental illness Paternal Grandmother     Breast cancer Neg Hx     Ovarian cancer Neg Hx     Colon cancer Neg Hx        Social History:   Social History     Socioeconomic History    Marital status: Single   Tobacco Use    Smoking status: Never     Passive exposure: Never    Smokeless tobacco: Never   Vaping Use    Vaping status: Never Used   Substance and Sexual Activity    Alcohol use: Yes     Alcohol/week: 1.0 standard drink of alcohol     Types: 1 Drinks containing 0.5 oz of alcohol per week     Comment: On outstandings    Drug use: Never    Sexual activity: Yes     Partners: Male     Birth control/protection: Condom, Tubal ligation       Medications:     Current Outpatient Medications:     Cetirizine HCl (ZYRTEC ALLERGY PO), Take 10 mg by mouth Daily., Disp: , Rfl:     desvenlafaxine (PRISTIQ) 50 MG 24 hr tablet, Take 1 tablet by mouth Daily., Disp: 90 tablet, Rfl: 1    lisinopril-hydrochlorothiazide (PRINZIDE,ZESTORETIC) 20-12.5 MG per tablet, Take 1 tablet by mouth Daily., Disp: 90 tablet, Rfl: 1    Multiple Vitamins-Calcium (DAILY VITAMINS FOR WOMEN PO), , Disp: , Rfl:     omeprazole (priLOSEC) 40 MG capsule, Take 1 capsule by mouth Daily., Disp: 90 capsule, Rfl: 1    nystatin-triamcinolone (MYCOLOG) 134425-9.1 UNIT/GM-% ointment, Apply 1 Application topically to the appropriate area as directed 2 (Two) Times a Day., Disp: 60 g, Rfl: 1    Allergies:   No Known Allergies      Objective     Physical Exam:  Vital Signs:   Vitals:    07/08/25 1030   BP: 123/81   Pulse: 71   Temp: 98.6 °F (37 °C)   SpO2: 99%   Weight: 86.6 kg (191 lb)   Height: 165.1 cm (65\")     Body mass index is 31.78 kg/m².     Physical Exam  Genitourinary:     General: Normal vulva.      Pubic Area: Rash present.      Vagina: No tenderness.      Comments: Erythematous lacy rash            Assessment / Plan      Assessment/Plan:   Diagnoses and all orders for this visit:    1. Possible exposure to STD (Primary)  -     Chlamydia trachomatis, " Neisseria gonorrhoeae, PCR - Urine, Urine, Clean Catch  -     MVP Vaginosis Panel - Swab, Vagina; Future    2. Candidiasis of genitalia    Other orders  -     nystatin-triamcinolone (MYCOLOG) 706681-6.1 UNIT/GM-% ointment; Apply 1 Application topically to the appropriate area as directed 2 (Two) Times a Day.  Dispense: 60 g; Refill: 1       Possible STD exposure will obtain urine for gonorrhea chlamydia will call with results and further recommendations  Candidiasis will treat with Mycolog cream recommend keep area clean and dry      Follow Up:   Return if symptoms worsen or fail to improve.    CATHY Mohr      Please note that portions of this note were completed with a voice recognition program.

## 2025-07-29 NOTE — PROGRESS NOTES
ACUTE VISIT     Patient Name: Francine Calderon  : 1985   MRN: [id number]     Chief Complaint:  Rash    History of Present Illness: Francine Calderon is a 39 y.o. female who is here today for Rash    Pt c/o rash in pubic hair  Shaving pubic hair  Severe itching    Tried nystatin no relief     Subjective      Review of Systems:   Review of Systems   Constitutional:  Negative for fever.   Respiratory:  Negative for shortness of breath.    Cardiovascular:  Negative for chest pain.   Gastrointestinal:  Negative for abdominal pain.   Genitourinary:  Negative for dysuria.   Skin:  Positive for rash.        Past Medical History:   Past Medical History:   Diagnosis Date    Abnormal Pap smear of cervix     Allergic     Anxiety     Depression     Headache     Have day before period. Also at random times that will last for days to aweek    Herpes     HPV (human papilloma virus) infection     Hypertension     Irritable bowel syndrome     Scoliosis        Past Surgical History:   Past Surgical History:   Procedure Laterality Date    BACK SURGERY      to repair scoliosis    D & C HYSTEROSCOPY ENDOMETRIAL ABLATION Bilateral 2024    Procedure: DILATATION AND CURETTAGE HYSTEROSCOPY NOVASURE ENDOMETRIAL ABLATION;  Surgeon: Dusty Rivas MD;  Location: [facility];  Service: Gynecology;  Laterality: Bilateral;    ENDOMETRIAL ABLATION  24    With a salpingectomy    SALPINGECTOMY Bilateral 2024    Procedure: SALPINGECTOMY LAPAROSCOPIC;  Surgeon: Dusty Rivas MD;  Location: [facility];  Service: Gynecology;  Laterality: Bilateral;    SPINE SURGERY  rods to correct scolosis       Family History:   Family History   Problem Relation Age of Onset    Uterine cancer Maternal Grandmother     Cancer Maternal Grandmother     Depression Maternal Grandmother     Miscarriages / Stillbirths Maternal Grandmother     Uterine cancer Paternal Aunt     Depression Mother         Recently on medication    Mental  "illness Paternal Grandmother     Breast cancer Neg Hx     Ovarian cancer Neg Hx     Colon cancer Neg Hx        Social History:   Social History     Socioeconomic History    Marital status: Single   Tobacco Use    Smoking status: Never     Passive exposure: Never    Smokeless tobacco: Never   Vaping Use    Vaping status: Never Used   Substance and Sexual Activity    Alcohol use: Yes     Alcohol/week: 1.0 standard drink of alcohol     Types: 1 Drinks containing 0.5 oz of alcohol per week     Comment: On outstandings    Drug use: Never    Sexual activity: Yes     Partners: Male     Birth control/protection: Condom, Tubal ligation       Medications:     Current Outpatient Medications:     Cetirizine HCl (ZYRTEC ALLERGY PO), Take 10 mg by mouth Daily., Disp: , Rfl:     desvenlafaxine (PRISTIQ) 50 MG 24 hr tablet, Take 1 tablet by mouth Daily., Disp: 90 tablet, Rfl: 1    lisinopril-hydrochlorothiazide (PRINZIDE,ZESTORETIC) 20-12.5 MG per tablet, Take 1 tablet by mouth Daily., Disp: 90 tablet, Rfl: 1    omeprazole (priLOSEC) 40 MG capsule, Take 1 capsule by mouth Daily., Disp: 90 capsule, Rfl: 1    clobetasol propionate (TEMOVATE) 0.05 % cream, Apply 1 Application topically to the appropriate area as directed 2 (Two) Times a Day., Disp: 60 g, Rfl: 1    doxycycline (MONODOX) 100 MG capsule, Take 1 capsule by mouth 2 (Two) Times a Day., Disp: 14 capsule, Rfl: 0    Allergies:   No Known Allergies      Objective     Physical Exam:  Vital Signs:   Vitals:    07/30/25 0943   BP: 122/83   BP Location: Right arm   Patient Position: Sitting   Cuff Size: Adult   Pulse: 81   Temp: 98.2 °F (36.8 °C)   TempSrc: Temporal   SpO2: 100%   Weight: 86.6 kg (191 lb)   Height: 165.1 cm (65\")     Body mass index is 31.78 kg/m².     Physical Exam  Cardiovascular:      Rate and Rhythm: Normal rate and regular rhythm.      Heart sounds: Normal heart sounds. No murmur heard.  Pulmonary:      Effort: Pulmonary effort is normal.      Breath sounds: " Normal breath sounds.   Genitourinary:     Comments: Erythematous hair follicles pubic area, no surrounding erythema, no edema   Musculoskeletal:      Right lower leg: No edema.      Left lower leg: No edema.   Skin:     General: Skin is warm and dry.      Findings: Rash present.   Neurological:      Mental Status: She is alert.             Assessment / Plan      Assessment/Plan:   Diagnoses and all orders for this visit:    1. Rash (Primary)    2. Folliculitis    Other orders  -     doxycycline (MONODOX) 100 MG capsule; Take 1 capsule by mouth 2 (Two) Times a Day.  Dispense: 14 capsule; Refill: 0  -     clobetasol propionate (TEMOVATE) 0.05 % cream; Apply 1 Application topically to the appropriate area as directed 2 (Two) Times a Day.  Dispense: 60 g; Refill: 1       Redness with folliculitis will treat with doxycycline provide clobetasol cream for itching inflammation did discuss avoiding shaving until rash folliculitis has resolved disposing of old razors      Follow Up:   Return if symptoms worsen or fail to improve.    CATHY Mohr      Please note that portions of this note were completed with a voice recognition program.

## 2025-07-30 ENCOUNTER — OFFICE VISIT (OUTPATIENT)
Dept: FAMILY MEDICINE CLINIC | Facility: CLINIC | Age: 40
End: 2025-07-30
Payer: COMMERCIAL

## 2025-07-30 VITALS
BODY MASS INDEX: 31.82 KG/M2 | OXYGEN SATURATION: 100 % | WEIGHT: 191 LBS | TEMPERATURE: 98.2 F | SYSTOLIC BLOOD PRESSURE: 122 MMHG | HEIGHT: 65 IN | HEART RATE: 81 BPM | DIASTOLIC BLOOD PRESSURE: 83 MMHG

## 2025-07-30 DIAGNOSIS — L73.9 FOLLICULITIS: ICD-10-CM

## 2025-07-30 DIAGNOSIS — R21 RASH: Primary | ICD-10-CM

## 2025-07-30 RX ORDER — CLOBETASOL PROPIONATE 0.5 MG/G
1 CREAM TOPICAL 2 TIMES DAILY
Qty: 60 G | Refills: 1 | Status: SHIPPED | OUTPATIENT
Start: 2025-07-30

## 2025-07-30 RX ORDER — DOXYCYCLINE 100 MG/1
100 CAPSULE ORAL 2 TIMES DAILY
Qty: 14 CAPSULE | Refills: 0 | Status: SHIPPED | OUTPATIENT
Start: 2025-07-30

## 2025-08-21 ENCOUNTER — OFFICE VISIT (OUTPATIENT)
Dept: ORTHOPEDIC SURGERY | Facility: CLINIC | Age: 40
End: 2025-08-21
Payer: COMMERCIAL

## 2025-08-21 VITALS — DIASTOLIC BLOOD PRESSURE: 85 MMHG | OXYGEN SATURATION: 98 % | HEART RATE: 73 BPM | SYSTOLIC BLOOD PRESSURE: 130 MMHG

## 2025-08-21 DIAGNOSIS — M65.4 DE QUERVAIN'S DISEASE (TENOSYNOVITIS): Primary | ICD-10-CM

## 2025-08-21 RX ADMIN — TRIAMCINOLONE ACETONIDE 40 MG: 40 INJECTION, SUSPENSION INTRA-ARTICULAR; INTRAMUSCULAR at 08:30

## 2025-08-21 RX ADMIN — LIDOCAINE HYDROCHLORIDE 1 ML: 10 INJECTION, SOLUTION INFILTRATION; PERINEURAL at 08:30

## 2025-08-22 RX ORDER — LIDOCAINE HYDROCHLORIDE 10 MG/ML
1 INJECTION, SOLUTION INFILTRATION; PERINEURAL
Status: COMPLETED | OUTPATIENT
Start: 2025-08-21 | End: 2025-08-21

## 2025-08-22 RX ORDER — TRIAMCINOLONE ACETONIDE 40 MG/ML
40 INJECTION, SUSPENSION INTRA-ARTICULAR; INTRAMUSCULAR
Status: COMPLETED | OUTPATIENT
Start: 2025-08-21 | End: 2025-08-21

## (undated) DEVICE — ENDOPATH XCEL WITH OPTIVIEW TECHNOLOGY BLADELESS TROCARS WITH STABILITY SLEEVES: Brand: ENDOPATH XCEL OPTIVIEW

## (undated) DEVICE — TROCAR: Brand: KII OPTICAL ACCESS SYSTEM

## (undated) DEVICE — TBG INSUFFLATION W/CPC CONNEC: Brand: MEDLINE INDUSTRIES, INC.

## (undated) DEVICE — 40585 XL ADVANCED TRENDELENBURG POSITIONING KIT: Brand: 40585 XL ADVANCED TRENDELENBURG POSITIONING KIT

## (undated) DEVICE — 1000ML,PRESSURE INFUSER W/STOPCOCK: Brand: MEDLINE

## (undated) DEVICE — GOWN,REINFORCE,POLY,SIRUS,BREATH SLV,XLG: Brand: MEDLINE

## (undated) DEVICE — DUAL LUMEN STOMACH TUBE: Brand: SALEM SUMP

## (undated) DEVICE — SLV SCD KN/LEN ADJ EXPRSS BLENDED MD 1P/U

## (undated) DEVICE — SOL NACL 0.9PCT 1000ML

## (undated) DEVICE — ADHS SKIN SURG TISS VISC PREMIERPRO EXOFIN HI/VISC FAST/DRY

## (undated) DEVICE — PREP TRAY WITH CHG: Brand: MEDLINE INDUSTRIES, INC.

## (undated) DEVICE — SHEET,DRAPE,70X85,STERILE: Brand: MEDLINE

## (undated) DEVICE — TOWEL,OR,DSP,ST,BLUE,STD,4/PK,20PK/CS: Brand: MEDLINE

## (undated) DEVICE — HYPODERMIC SAFETY NEEDLE: Brand: MONOJECT

## (undated) DEVICE — APPL CHLORAPREP 26ML CLR

## (undated) DEVICE — KIT,ANTI FOG,W/SPONGE & FLUID,SOFT PACK: Brand: MEDLINE

## (undated) DEVICE — GLV SURG SENSICARE PI ORTHO PF SZ7 LF STRL

## (undated) DEVICE — INTENDED FOR TISSUE SEPARATION, AND OTHER PROCEDURES THAT REQUIRE A SHARP SURGICAL BLADE TO PUNCTURE OR CUT.: Brand: BARD-PARKER ® CARBON RIB-BACK BLADES

## (undated) DEVICE — SUT MNCRYL PLS ANTIB UD 4/0 PS2 18IN

## (undated) DEVICE — CATH URETH INTRMIT ALLPURP LTX 16F RED

## (undated) DEVICE — PROB ABL ENDOMTRL NOVASURE/G4 W/SURESND

## (undated) DEVICE — INSUFFLATION NEEDLE TO ESTABLISH PNEUMOPERITONEUM.: Brand: INSUFFLATION NEEDLE

## (undated) DEVICE — MARYLAND JAW LAPAROSCOPIC SEALER/DIVIDER COATED: Brand: LIGASURE

## (undated) DEVICE — LITHOTOMY-YELLOW FINS: Brand: MEDLINE INDUSTRIES, INC.

## (undated) DEVICE — SYR LL TP 10ML STRL

## (undated) DEVICE — GLOVE,SURG,SENSICARE SLT,LF,PF,7: Brand: MEDLINE

## (undated) DEVICE — TOTAL TRAY, 16FR 10ML SIL FOLEY, URN: Brand: MEDLINE

## (undated) DEVICE — PCH SURG INST LAP 2 LF